# Patient Record
Sex: MALE | Race: WHITE | NOT HISPANIC OR LATINO | URBAN - METROPOLITAN AREA
[De-identification: names, ages, dates, MRNs, and addresses within clinical notes are randomized per-mention and may not be internally consistent; named-entity substitution may affect disease eponyms.]

---

## 2018-09-06 ENCOUNTER — OUTPATIENT (OUTPATIENT)
Dept: OUTPATIENT SERVICES | Facility: HOSPITAL | Age: 83
LOS: 1 days | End: 2018-09-06

## 2018-09-06 VITALS
HEART RATE: 90 BPM | RESPIRATION RATE: 16 BRPM | OXYGEN SATURATION: 98 % | DIASTOLIC BLOOD PRESSURE: 94 MMHG | WEIGHT: 218.04 LBS | TEMPERATURE: 98 F | SYSTOLIC BLOOD PRESSURE: 166 MMHG | HEIGHT: 70.5 IN

## 2018-09-06 DIAGNOSIS — K40.90 UNILATERAL INGUINAL HERNIA, WITHOUT OBSTRUCTION OR GANGRENE, NOT SPECIFIED AS RECURRENT: ICD-10-CM

## 2018-09-06 DIAGNOSIS — I10 ESSENTIAL (PRIMARY) HYPERTENSION: ICD-10-CM

## 2018-09-06 PROBLEM — Z00.00 ENCOUNTER FOR PREVENTIVE HEALTH EXAMINATION: Status: ACTIVE | Noted: 2018-09-06

## 2018-09-06 LAB
BUN SERPL-MCNC: 17 MG/DL — SIGNIFICANT CHANGE UP (ref 7–23)
CALCIUM SERPL-MCNC: 9.4 MG/DL — SIGNIFICANT CHANGE UP (ref 8.4–10.5)
CHLORIDE SERPL-SCNC: 106 MMOL/L — SIGNIFICANT CHANGE UP (ref 98–107)
CO2 SERPL-SCNC: 23 MMOL/L — SIGNIFICANT CHANGE UP (ref 22–31)
CREAT SERPL-MCNC: 1.34 MG/DL — HIGH (ref 0.5–1.3)
GLUCOSE SERPL-MCNC: 95 MG/DL — SIGNIFICANT CHANGE UP (ref 70–99)
HCT VFR BLD CALC: 40.1 % — SIGNIFICANT CHANGE UP (ref 39–50)
HGB BLD-MCNC: 13.4 G/DL — SIGNIFICANT CHANGE UP (ref 13–17)
MCHC RBC-ENTMCNC: 28.5 PG — SIGNIFICANT CHANGE UP (ref 27–34)
MCHC RBC-ENTMCNC: 33.4 % — SIGNIFICANT CHANGE UP (ref 32–36)
MCV RBC AUTO: 85.3 FL — SIGNIFICANT CHANGE UP (ref 80–100)
NRBC # FLD: 0 — SIGNIFICANT CHANGE UP
PLATELET # BLD AUTO: 165 K/UL — SIGNIFICANT CHANGE UP (ref 150–400)
PMV BLD: 9.8 FL — SIGNIFICANT CHANGE UP (ref 7–13)
POTASSIUM SERPL-MCNC: 4.1 MMOL/L — SIGNIFICANT CHANGE UP (ref 3.5–5.3)
POTASSIUM SERPL-SCNC: 4.1 MMOL/L — SIGNIFICANT CHANGE UP (ref 3.5–5.3)
RBC # BLD: 4.7 M/UL — SIGNIFICANT CHANGE UP (ref 4.2–5.8)
RBC # FLD: 13.6 % — SIGNIFICANT CHANGE UP (ref 10.3–14.5)
SODIUM SERPL-SCNC: 141 MMOL/L — SIGNIFICANT CHANGE UP (ref 135–145)
WBC # BLD: 4.79 K/UL — SIGNIFICANT CHANGE UP (ref 3.8–10.5)
WBC # FLD AUTO: 4.79 K/UL — SIGNIFICANT CHANGE UP (ref 3.8–10.5)

## 2018-09-06 NOTE — H&P PST ADULT - PROBLEM SELECTOR PLAN 2
pt takes tamsulosin and lisinopril at night , instructed to take the night before surgery , monitor BP during hospital stay . BP elevated in pst , cardiac evaluation pending from Dr GABE Walton , [id number] ekg and stress test , requested to be faxed . Pt offers no complaints in pst , denies headache , visual changes or numbness or tingling.

## 2018-09-06 NOTE — H&P PST ADULT - HISTORY OF PRESENT ILLNESS
This is an 84 y.o. male who presented to [facility] complaining of inguinal hernia . Pt evaluated , now for surgery .

## 2018-09-06 NOTE — H&P PST ADULT - MUSCULOSKELETAL
details… detailed exam normal strength/ROM intact/no joint warmth/no joint swelling/no joint erythema/no calf tenderness

## 2018-09-06 NOTE — H&P PST ADULT - LYMPHATIC
anterior cervical R/posterior cervical R/anterior cervical L/supraclavicular L/posterior cervical L/supraclavicular R

## 2018-09-06 NOTE — H&P PST ADULT - RS GEN PE MLT RESP DETAILS PC
clear to auscultation bilaterally/respirations non-labored/no rhonchi/no wheezes/breath sounds equal/good air movement/no rales

## 2018-09-06 NOTE — H&P PST ADULT - NSANTHOSAYNRD_GEN_A_CORE
No. RONY screening performed.  STOP BANG Legend: 0-2 = LOW Risk; 3-4 = INTERMEDIATE Risk; 5-8 = HIGH Risk

## 2018-09-12 ENCOUNTER — TRANSCRIPTION ENCOUNTER (OUTPATIENT)
Age: 83
End: 2018-09-12

## 2018-09-12 NOTE — ASU PATIENT PROFILE, ADULT - TEACHING/LEARNING LEARNING PREFERENCES
computer/internet/written material/group instruction/individual instruction/video/verbal instruction/skill demonstration/audio/pictorial

## 2018-09-13 ENCOUNTER — OUTPATIENT (OUTPATIENT)
Dept: OUTPATIENT SERVICES | Facility: HOSPITAL | Age: 83
LOS: 1 days | Discharge: HOME | End: 2018-09-13
Payer: MEDICARE

## 2018-09-13 ENCOUNTER — RESULT REVIEW (OUTPATIENT)
Age: 83
End: 2018-09-13

## 2018-09-13 VITALS
RESPIRATION RATE: 14 BRPM | OXYGEN SATURATION: 100 % | HEART RATE: 79 BPM | DIASTOLIC BLOOD PRESSURE: 85 MMHG | TEMPERATURE: 97 F | SYSTOLIC BLOOD PRESSURE: 156 MMHG

## 2018-09-13 VITALS
SYSTOLIC BLOOD PRESSURE: 176 MMHG | HEIGHT: 70.5 IN | RESPIRATION RATE: 18 BRPM | DIASTOLIC BLOOD PRESSURE: 95 MMHG | HEART RATE: 81 BPM | TEMPERATURE: 98 F | OXYGEN SATURATION: 99 % | WEIGHT: 218.04 LBS

## 2018-09-13 DIAGNOSIS — K40.90 UNILATERAL INGUINAL HERNIA, WITHOUT OBSTRUCTION OR GANGRENE, NOT SPECIFIED AS RECURRENT: ICD-10-CM

## 2018-09-13 PROCEDURE — 88304 TISSUE EXAM BY PATHOLOGIST: CPT | Mod: 26

## 2018-09-13 PROCEDURE — 88302 TISSUE EXAM BY PATHOLOGIST: CPT | Mod: 26

## 2018-09-13 RX ORDER — SODIUM CHLORIDE 9 MG/ML
1000 INJECTION, SOLUTION INTRAVENOUS
Qty: 0 | Refills: 0 | Status: DISCONTINUED | OUTPATIENT
Start: 2018-09-13 | End: 2018-09-14

## 2018-09-13 RX ADMIN — SODIUM CHLORIDE 30 MILLILITER(S): 9 INJECTION, SOLUTION INTRAVENOUS at 13:57

## 2018-09-13 NOTE — ASU DISCHARGE PLAN (ADULT/PEDIATRIC). - NURSING INSTRUCTIONS
1 Percocet contains 325mg. of Tylenol (ACETAMINOPHEN) . DO NOT EXCEED 3000mg  of Tylenol in a 24 hour period. 1 Percocet contains 325mg. of Tylenol (ACETAMINOPHEN) . DO NOT EXCEED 3000mg  of Tylenol in a 24 hour period.  When taking pain meds - take with food and know it may cause constipation. To prevent constipation increase fluids and fiber in diet. - Do NOT drive while on narcotics.   You were given IV Tylenol for pain management.  Please DO NOT take tylenol for the next 6-8 hours (after 5pm ). Please do not exceed 3000mg in 24hours.   You were given an antibiotic ( Cefazolin 2000mg ) in OR today about 10:30am

## 2018-09-13 NOTE — ASU DISCHARGE PLAN (ADULT/PEDIATRIC). - BATHING
shower in 24 hours, leave steri strips intact shower only/shower in 24 hours, leave steri strips intact

## 2018-09-13 NOTE — ASU DISCHARGE PLAN (ADULT/PEDIATRIC). - B. DRINK ALCOHOL, BEER, OR WINE
34y/o  POD#1 from [facility] for breech in stable condition. Current issues include low urine output.
A/P: 34yo POD#3 s/p LTCS.  Patient is stable and is doing well post-operatively.
A/P: 34yo POD#3 s/p LTCS. Patient is stable and is doing well post-operatively.
A/P: 36yo POD#2 s/p LTCS. Patient is stable and doing well post-operatively.
Statement Selected

## 2018-09-13 NOTE — ASU DISCHARGE PLAN (ADULT/PEDIATRIC). - CONDITIONS AT DISCHARGE
stable for discharge stable for discharge  Patient is stable and meets discharge criteria. Patient made aware that he/she must wait on unit to be escorted to awaiting car in front of the main building after being discharged by Anesthesia Department.

## 2018-09-13 NOTE — ASU DISCHARGE PLAN (ADULT/PEDIATRIC). - COMMENTS
Surgical Unit will call you on the next business day to follow up. [facility] is open Monday - Friday.

## 2018-09-13 NOTE — ASU DISCHARGE PLAN (ADULT/PEDIATRIC). - ITEMS TO FOLLOWUP WITH YOUR PHYSICIAN'S
follow up with Dr. Gagnon in 1 week, please call the office to set up an appointment follow up with Dr. Gagnon in 1 week, please call the office to set up an appointment  After showering pat dry steri strips.  Do Not rub them.  They will curl up and fall off by themselves within 7 days.

## 2018-09-13 NOTE — ASU DISCHARGE PLAN (ADULT/PEDIATRIC). - SPECIAL INSTRUCTIONS
RAY teaching, empty the drain daily. RAY teaching, empty the drain daily.  Marcus Medina drain instructions done verbally with skill demonstration done. Written instructions given to patient.

## 2018-09-13 NOTE — ASU DISCHARGE PLAN (ADULT/PEDIATRIC). - MEDICATION SUMMARY - MEDICATIONS TO TAKE
I will START or STAY ON the medications listed below when I get home from the hospital:    oxyCODONE-acetaminophen 5 mg-325 mg oral tablet  -- 1 tab(s) by mouth every 6 hours MDD:4  -- Caution federal law prohibits the transfer of this drug to any person other  than the person for whom it was prescribed.  May cause drowsiness.  Alcohol may intensify this effect.  Use care when operating dangerous machinery.  This prescription cannot be refilled.  This product contains acetaminophen.  Do not use  with any other product containing acetaminophen to prevent possible liver damage.  Using more of this medication than prescribed may cause serious breathing problems.    -- Indication: For severe pain, do not take with Tylenol    aspirin 81 mg oral tablet  -- 1 tab(s) by mouth once a day  last dose 9/5  -- Indication: For CAD    lisinopril 10 mg oral tablet  -- 1 tab(s) by mouth once a day in pm  -- Indication: For HTN    tamsulosin 0.4 mg oral capsule  -- 1 cap(s) by mouth once a day in pm  -- Indication: For BPH

## 2018-09-13 NOTE — BRIEF OPERATIVE NOTE - PROCEDURE
<<-----Click on this checkbox to enter Procedure Inguinal hernia repair with mesh  09/13/2018  right  Active  YALSALMAY

## 2018-09-13 NOTE — ASU DISCHARGE PLAN (ADULT/PEDIATRIC). - NOTIFY
Persistent Nausea and Vomiting/Bleeding that does not stop/Pain not relieved by Medications/Swelling that continues/Unable to Urinate Persistent Nausea and Vomiting/Bleeding that does not stop/Pain not relieved by Medications/Swelling that continues/Fever greater than 101/Unable to Urinate/Inability to Tolerate Liquids or Foods

## 2018-09-18 LAB — SURGICAL PATHOLOGY STUDY: SIGNIFICANT CHANGE UP

## 2021-06-13 ENCOUNTER — APPOINTMENT (OUTPATIENT)
Dept: CT IMAGING | Age: 86
End: 2021-06-13
Attending: EMERGENCY MEDICINE
Payer: MEDICARE

## 2021-06-13 ENCOUNTER — HOSPITAL ENCOUNTER (EMERGENCY)
Age: 86
Discharge: HOME | End: 2021-06-13
Attending: EMERGENCY MEDICINE
Payer: MEDICARE

## 2021-06-13 VITALS
SYSTOLIC BLOOD PRESSURE: 159 MMHG | DIASTOLIC BLOOD PRESSURE: 79 MMHG | RESPIRATION RATE: 20 BRPM | BODY MASS INDEX: 30.48 KG/M2 | HEART RATE: 88 BPM | OXYGEN SATURATION: 97 % | WEIGHT: 230 LBS | HEIGHT: 73 IN | TEMPERATURE: 99.5 F

## 2021-06-13 DIAGNOSIS — S01.81XA FACIAL LACERATION, INITIAL ENCOUNTER: ICD-10-CM

## 2021-06-13 DIAGNOSIS — N39.0 URINARY TRACT INFECTION WITHOUT HEMATURIA, SITE UNSPECIFIED: ICD-10-CM

## 2021-06-13 DIAGNOSIS — N28.9 RENAL INSUFFICIENCY: ICD-10-CM
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
## 2021-06-13 DIAGNOSIS — I10 ESSENTIAL HYPERTENSION: ICD-10-CM

## 2021-06-13 LAB
ANION GAP SERPL CALC-SCNC: 9 MMOL/L (ref 3–18)
APPEARANCE UR: ABNORMAL
BACTERIA URNS QL MICRO: ABNORMAL /HPF
BASOPHILS # BLD: 0.1 K/UL (ref 0–0.1)
BASOPHILS NFR BLD: 1 % (ref 0–2)
BILIRUB UR QL: NEGATIVE
BUN SERPL-MCNC: 36 MG/DL (ref 7–18)
BUN/CREAT SERPL: 22 (ref 12–20)
CALCIUM SERPL-MCNC: 8.6 MG/DL (ref 8.5–10.1)
CHLORIDE SERPL-SCNC: 112 MMOL/L (ref 100–111)
CK MB CFR SERPL CALC: 1.5 % (ref 0–4)
CK MB SERPL-MCNC: 3.3 NG/ML (ref 5–25)
CK SERPL-CCNC: 226 U/L (ref 39–308)
CO2 SERPL-SCNC: 24 MMOL/L (ref 21–32)
COLOR UR: ABNORMAL
CREAT SERPL-MCNC: 1.65 MG/DL (ref 0.6–1.3)
DIFFERENTIAL METHOD BLD: ABNORMAL
EOSINOPHIL # BLD: 0.1 K/UL (ref 0–0.4)
EOSINOPHIL NFR BLD: 1 % (ref 0–5)
ERYTHROCYTE [DISTWIDTH] IN BLOOD BY AUTOMATED COUNT: 13.1 % (ref 11.6–14.5)
GLUCOSE BLD STRIP.AUTO-MCNC: 96 MG/DL (ref 70–110)
GLUCOSE SERPL-MCNC: 96 MG/DL (ref 74–99)
GLUCOSE UR STRIP.AUTO-MCNC: NEGATIVE MG/DL
HCT VFR BLD AUTO: 39.2 % (ref 36–48)
HGB BLD-MCNC: 12.9 G/DL (ref 13–16)
HGB UR QL STRIP: ABNORMAL
KETONES UR QL STRIP.AUTO: ABNORMAL MG/DL
LEUKOCYTE ESTERASE UR QL STRIP.AUTO: ABNORMAL
LYMPHOCYTES # BLD: 1.1 K/UL (ref 0.9–3.6)
LYMPHOCYTES NFR BLD: 14 % (ref 21–52)
MCH RBC QN AUTO: 27.6 PG (ref 24–34)
MCHC RBC AUTO-ENTMCNC: 32.9 G/DL (ref 31–37)
MCV RBC AUTO: 83.8 FL (ref 74–97)
MONOCYTES # BLD: 0.6 K/UL (ref 0.05–1.2)
MONOCYTES NFR BLD: 7 % (ref 3–10)
NEUTS SEG # BLD: 6.5 K/UL (ref 1.8–8)
NEUTS SEG NFR BLD: 77 % (ref 40–73)
NITRITE UR QL STRIP.AUTO: POSITIVE
PH UR STRIP: 5.5 [PH] (ref 5–8)
PLATELET # BLD AUTO: 281 K/UL (ref 135–420)
PMV BLD AUTO: 10 FL (ref 9.2–11.8)
POTASSIUM SERPL-SCNC: 4.2 MMOL/L (ref 3.5–5.5)
PROT UR STRIP-MCNC: 100 MG/DL
RBC # BLD AUTO: 4.68 M/UL (ref 4.35–5.65)
RBC #/AREA URNS HPF: ABNORMAL /HPF (ref 0–5)
SODIUM SERPL-SCNC: 145 MMOL/L (ref 136–145)
SP GR UR REFRACTOMETRY: 1.01 (ref 1–1.03)
TROPONIN I SERPL-MCNC: 0.07 NG/ML (ref 0–0.04)
TROPONIN I SERPL-MCNC: 0.08 NG/ML (ref 0–0.04)
UROBILINOGEN UR QL STRIP.AUTO: 1 EU/DL (ref 0.2–1)
WBC # BLD AUTO: 8.4 K/UL (ref 4.6–13.2)
WBC URNS QL MICRO: ABNORMAL /HPF (ref 0–4)

## 2021-06-13 PROCEDURE — 80048 BASIC METABOLIC PNL TOTAL CA: CPT

## 2021-06-13 PROCEDURE — 81001 URINALYSIS AUTO W/SCOPE: CPT

## 2021-06-13 PROCEDURE — 93005 ELECTROCARDIOGRAM TRACING: CPT

## 2021-06-13 PROCEDURE — 70450 CT HEAD/BRAIN W/O DYE: CPT

## 2021-06-13 PROCEDURE — 82962 GLUCOSE BLOOD TEST: CPT

## 2021-06-13 PROCEDURE — 99285 EMERGENCY DEPT VISIT HI MDM: CPT

## 2021-06-13 PROCEDURE — 85025 COMPLETE CBC W/AUTO DIFF WBC: CPT

## 2021-06-13 PROCEDURE — 82553 CREATINE MB FRACTION: CPT

## 2021-06-13 RX ORDER — SULFAMETHOXAZOLE AND TRIMETHOPRIM 800; 160 MG/1; MG/1
1 TABLET ORAL 2 TIMES DAILY
Qty: 10 TABLET | Refills: 0 | Status: SHIPPED | OUTPATIENT
Start: 2021-06-13 | End: 2021-06-18
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
## 2021-06-13 NOTE — ED PROVIDER NOTES
63-year-old male history of hypertension presents for evaluation of fall. Patient here with his wife staying in a local hotel while visiting relatives. Had not eaten since yesterday evening. He does not recall the fall. His wife states she heard a thump and look to find that he was on the floor. He did not immediately respond and took a few seconds to come around. Unclear syncope. No seizure activity. No chest pain. Patient has a cut lateral to the left periorbital region. Modest bleeding. Denies headache. Denies chest pain or shortness of breath. Has no focal complaints at this time and states that he feels \"pretty good. \"           Past Medical History:   Diagnosis Date    Hypertension        Past Surgical History:   Procedure Laterality Date    HX HERNIA REPAIR           History reviewed. No pertinent family history. Social History     Socioeconomic History    Marital status:      Spouse name: Not on file    Number of children: Not on file    Years of education: Not on file    Highest education level: Not on file   Occupational History    Not on file   Tobacco Use    Smoking status: Never Smoker    Smokeless tobacco: Never Used   Substance and Sexual Activity    Alcohol use: Never    Drug use: Not on file    Sexual activity: Not on file   Other Topics Concern    Not on file   Social History Narrative    Not on file     Social Determinants of Health     Financial Resource Strain:     Difficulty of Paying Living Expenses:    Food Insecurity:     Worried About Running Out of Food in the Last Year:     [address] in the Last Year:    Transportation Needs:     Lack of Transportation (Medical):      Lack of Transportation (Non-Medical):    Physical Activity:     Days of Exercise per Week:     Minutes of Exercise per Session:    Stress:     Feeling of Stress :    Social Connections:     Frequency of Communication with Friends and Family:     Frequency of Social Gatherings with Friends and Family:     Attends Yarsanism Services:     Active Member of Clubs or Organizations:     Attends Club or Organization Meetings:     Marital Status:    Intimate Partner Violence:     Fear of Current or Ex-Partner:     Emotionally Abused:     Physically Abused:     Sexually Abused: ALLERGIES: Patient has no known allergies. Review of Systems   Constitutional: Negative for fever. HENT: Negative for congestion. Respiratory: Negative for shortness of breath. Cardiovascular: Negative for chest pain. Gastrointestinal: Negative for diarrhea and vomiting. Neurological: Positive for syncope (possibly, see hpi). Negative for light-headedness and headaches. All other systems reviewed and are negative. Vitals:    06/13/21 1430 06/13/21 1456 06/13/21 1530 06/13/21 1600   BP: (!) 168/98  (!) 159/77 (!) 147/66   Pulse: 90 90 95 91   Resp: 17 19 20 19   Temp:       SpO2: 100% 98%     Weight:       Height:                Physical Exam  Vitals and nursing note reviewed. Constitutional:       General: He is not in acute distress. Appearance: He is well-developed. HENT:      Head: Normocephalic. Comments: 1.5 cm laceration superior and lateral to the left eye. Modest bleeding. No ocular involvement. Small hematoma at the laceration site. No bony deformity. Nose: No congestion or rhinorrhea. Mouth/Throat:      Pharynx: Oropharynx is clear. Eyes:      General: No scleral icterus. Extraocular Movements: Extraocular movements intact. Pupils: Pupils are equal, round, and reactive to light. Neck:      Comments: No cervical spine tenderness. Cardiovascular:      Rate and Rhythm: Normal rate and regular rhythm. Heart sounds: Normal heart sounds. Pulmonary:      Effort: Pulmonary effort is normal.      Breath sounds: Normal breath sounds. Chest:      Chest wall: No tenderness. Abdominal:      Tenderness:  There is no abdominal tenderness. There is no guarding. Musculoskeletal:         General: No swelling or tenderness. Skin:     General: Skin is warm and dry. Neurological:      General: No focal deficit present. Mental Status: He is alert and oriented to person, place, and time. Cranial Nerves: No cranial nerve deficit. Sensory: No sensory deficit. Psychiatric:         Mood and Affect: Mood normal.       CT HEAD WO CONT   Final Result      No CT evidence of acute intracranial hemorrhage. 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 Mild burden of presumed chronic white matter microvascular ischemic changes. Recent Results (from the past 12 hour(s))   EKG, 12 LEAD, INITIAL    Collection Time: 06/13/21 11:55 AM   Result Value Ref Range    Ventricular Rate 97 BPM    Atrial Rate 97 BPM    P-R Interval 170 ms    QRS Duration 84 ms    Q-T Interval 400 ms    QTC Calculation (Bezet) 508 ms    Calculated P Axis 77 degrees    Calculated R Axis 16 degrees    Calculated T Axis 96 degrees    Diagnosis       Normal sinus rhythm  Nonspecific T wave abnormality  Prolonged QT  Abnormal ECG  No previous ECGs available     CBC WITH AUTOMATED DIFF    Collection Time: 06/13/21 12:00 PM   Result Value Ref Range    WBC 8.4 4.6 - 13.2 K/uL    RBC 4.68 4.35 - 5.65 M/uL    HGB 12.9 (L) 13.0 - 16.0 g/dL    HCT 39.2 36.0 - 48.0 %    MCV 83.8 74.0 - 97.0 FL    MCH 27.6 24.0 - 34.0 PG    MCHC 32.9 31.0 - 37.0 g/dL    RDW 13.1 11.6 - 14.5 %    PLATELET 527 072 - 653 K/uL    MPV 10.0 9.2 - 11.8 FL    NEUTROPHILS 77 (H) 40 - 73 %    LYMPHOCYTES 14 (L) 21 - 52 %    MONOCYTES 7 3 - 10 %    EOSINOPHILS 1 0 - 5 %    BASOPHILS 1 0 - 2 %    ABS. NEUTROPHILS 6.5 1.8 - 8.0 K/UL    ABS. LYMPHOCYTES 1.1 0.9 - 3.6 K/UL    ABS. MONOCYTES 0.6 0.05 - 1.2 K/UL    ABS.  EOSINOPHILS 0.1 0.0 - 0.4 K/UL    ABS. BASOPHILS 0.1 0.0 - 0.1 K/UL    DF AUTOMATED     METABOLIC PANEL, BASIC    Collection Time: 06/13/21 12:00 PM   Result Value Ref Range    Sodium 145 136 - 145 mmol/L    Potassium 4.2 3.5 - 5.5 mmol/L    Chloride 112 (H) 100 - 111 mmol/L    CO2 24 21 - 32 mmol/L    Anion gap 9 3.0 - 18 mmol/L    Glucose 96 74 - 99 mg/dL    BUN 36 (H) 7.0 - 18 MG/DL    Creatinine 1.65 (H) 0.6 - 1.3 MG/DL    BUN/Creatinine ratio 22 (H) 12 - 20      GFR est AA 48 (L) >60 ml/min/1.73m2    GFR est non-AA 40 (L) >60 ml/min/1.73m2    Calcium 8.6 8.5 - 10.1 MG/DL   CARDIAC PANEL,(CK, CKMB & TROPONIN)    Collection Time: 06/13/21 12:00 PM   Result Value Ref Range    CK - MB 3.3 <3.6 ng/ml    CK-MB Index 1.5 0.0 - 4.0 %     39 - 308 U/L    Troponin-I, QT 0.08 (H) 0.0 - 0.045 NG/ML   GLUCOSE, POC    Collection Time: 06/13/21 12:07 PM   Result Value Ref Range    Glucose (POC) 96 70 - 110 mg/dL   URINALYSIS W/ RFLX MICROSCOPIC    Collection Time: 06/13/21  3:15 PM   Result Value Ref Range    Color DARK YELLOW      Appearance CLOUDY      Specific gravity 1.013 1.005 - 1.030      pH (UA) 5.5 5.0 - 8.0      Protein 100 (A) NEG mg/dL    Glucose Negative NEG mg/dL    Ketone TRACE (A) NEG mg/dL    Bilirubin Negative NEG      Blood SMALL (A) NEG      Urobilinogen 1.0 0.2 - 1.0 EU/dL    Nitrites Positive (A) NEG      Leukocyte Esterase LARGE (A) NEG     URINE MICROSCOPIC ONLY    Collection Time: 06/13/21  3:15 PM   Result Value Ref Range    WBC TOO NUMEROUS TO COUNT 0 - 4 /hpf    RBC 0 to 3 0 - 5 /hpf    Bacteria 1+ (A) NEG /hpf   TROPONIN I    Collection Time: 06/13/21  4:15 PM   Result Value Ref Range    Troponin-I, QT 0.07 (H) 0.0 - 0.045 NG/ML     EKG interpreted per myself at 1157 hrs. reveals sinus mechanism, rate 97. Prolonged QT interval.  Nonspecific lateral ST-T wave change. (No old EKGs for comparison. )        MDM  Number of Diagnoses or Management Options  Essential hypertension  Facial laceration, initial encounter  Fall, initial encounter  Renal insufficiency  Urinary tract infection without hematuria, site unspecified  Diagnosis management comments: Impression: Status post fall with facial laceration. Unclear whether the patient simply lost his balance or perhaps suffered syncope. Screening labs and EKG reviewed. Head CT negative for acute traumatic pathology. Urinalysis positive. Will initiate treatment. Initial CPK elevated, repeat level slightly lower. No chest pain. Sinus rhythm. Stable for follow-up. Procedures      Diagnosis:   1. Fall, initial encounter    2. Facial laceration, initial encounter    3. Renal insufficiency    4. Essential hypertension    5. Urinary tract infection without hematuria, site unspecified          Disposition: home    Follow-up Information     Follow up With Specialties Details Why Contact Info    Your PCP   upon return to [address] [place] [address] DEPT Emergency Medicine  As needed, If symptoms worsen [address] [id number] [phone]          Patient's Medications   Start Taking    TRIMETHOPRIM-SULFAMETHOXAZOLE (BACTRIM DS) 160-800 MG PER TABLET    Take 1 Tablet by mouth two (2) times a day for 5 days.    Continue Taking    No medications on file   These Medications have changed    No medications on file   Stop Taking    No medications on file

## 2021-06-13 NOTE — DISCHARGE INSTRUCTIONS
Suture removal in 7 days. Okay to take Tylenol for headache. Head CT negative for acute traumatic injury. Return for severe pain, repetitive vomiting, acute change in vision or mental status, or other acutely worsening symptoms. See your primary care physician upon return to [place] for recheck. Blood pressure elevated to 168/98 (normal is less than 130/80). Follow-up with primary care physician for recheck of same. Urine positive for infection start Bactrim and take as directed.

## 2021-06-14 LAB
ATRIAL RATE: 97 BPM
CALCULATED P AXIS, ECG09: 77 DEGREES
CALCULATED R AXIS, ECG10: 16 DEGREES
CALCULATED T AXIS, ECG11: 96 DEGREES
DIAGNOSIS, 93000: NORMAL
P-R INTERVAL, ECG05: 170 MS
Q-T INTERVAL, ECG07: 400 MS
QRS DURATION, ECG06: 84 MS
QTC CALCULATION (BEZET), ECG08: 508 MS
VENTRICULAR RATE, ECG03: 97 BPM

## 2021-08-27 ENCOUNTER — INPATIENT (INPATIENT)
Facility: HOSPITAL | Age: 86
LOS: 6 days | Discharge: ANOTHER HOSPITAL | End: 2021-09-03
Attending: STUDENT IN AN ORGANIZED HEALTH CARE EDUCATION/TRAINING PROGRAM | Admitting: STUDENT IN AN ORGANIZED HEALTH CARE EDUCATION/TRAINING PROGRAM
Payer: MEDICARE

## 2021-08-27 VITALS
RESPIRATION RATE: 17 BRPM | TEMPERATURE: 98 F | HEIGHT: 70.5 IN | OXYGEN SATURATION: 100 % | HEART RATE: 101 BPM | SYSTOLIC BLOOD PRESSURE: 141 MMHG | DIASTOLIC BLOOD PRESSURE: 63 MMHG

## 2021-08-27 LAB
ALBUMIN SERPL ELPH-MCNC: 3.7 G/DL — SIGNIFICANT CHANGE UP (ref 3.3–5)
ALP SERPL-CCNC: 61 U/L — SIGNIFICANT CHANGE UP (ref 40–120)
ALT FLD-CCNC: 12 U/L — SIGNIFICANT CHANGE UP (ref 4–41)
ANION GAP SERPL CALC-SCNC: 12 MMOL/L — SIGNIFICANT CHANGE UP (ref 7–14)
APPEARANCE UR: ABNORMAL
AST SERPL-CCNC: 16 U/L — SIGNIFICANT CHANGE UP (ref 4–40)
BASOPHILS # BLD AUTO: 0.02 K/UL — SIGNIFICANT CHANGE UP (ref 0–0.2)
BASOPHILS NFR BLD AUTO: 0.5 % — SIGNIFICANT CHANGE UP (ref 0–2)
BILIRUB SERPL-MCNC: 0.7 MG/DL — SIGNIFICANT CHANGE UP (ref 0.2–1.2)
BILIRUB UR-MCNC: NEGATIVE — SIGNIFICANT CHANGE UP
BLD GP AB SCN SERPL QL: NEGATIVE — SIGNIFICANT CHANGE UP
BUN SERPL-MCNC: 18 MG/DL — SIGNIFICANT CHANGE UP (ref 7–23)
CALCIUM SERPL-MCNC: 9.4 MG/DL — SIGNIFICANT CHANGE UP (ref 8.4–10.5)
CHLORIDE SERPL-SCNC: 106 MMOL/L — SIGNIFICANT CHANGE UP (ref 98–107)
CO2 SERPL-SCNC: 23 MMOL/L — SIGNIFICANT CHANGE UP (ref 22–31)
COLOR SPEC: YELLOW — SIGNIFICANT CHANGE UP
CREAT SERPL-MCNC: 1.32 MG/DL — HIGH (ref 0.5–1.3)
DIFF PNL FLD: ABNORMAL
EOSINOPHIL # BLD AUTO: 0.04 K/UL — SIGNIFICANT CHANGE UP (ref 0–0.5)
EOSINOPHIL NFR BLD AUTO: 0.9 % — SIGNIFICANT CHANGE UP (ref 0–6)
GLUCOSE SERPL-MCNC: 89 MG/DL — SIGNIFICANT CHANGE UP (ref 70–99)
GLUCOSE UR QL: NEGATIVE — SIGNIFICANT CHANGE UP
HCT VFR BLD CALC: 38.3 % — LOW (ref 39–50)
HGB BLD-MCNC: 12.4 G/DL — LOW (ref 13–17)
IANC: 2.94 K/UL — SIGNIFICANT CHANGE UP (ref 1.5–8.5)
IMM GRANULOCYTES NFR BLD AUTO: 0.2 % — SIGNIFICANT CHANGE UP (ref 0–1.5)
KETONES UR-MCNC: ABNORMAL
LEUKOCYTE ESTERASE UR-ACNC: ABNORMAL
LYMPHOCYTES # BLD AUTO: 0.82 K/UL — LOW (ref 1–3.3)
LYMPHOCYTES # BLD AUTO: 18.9 % — SIGNIFICANT CHANGE UP (ref 13–44)
MCHC RBC-ENTMCNC: 28.1 PG — SIGNIFICANT CHANGE UP (ref 27–34)
MCHC RBC-ENTMCNC: 32.4 GM/DL — SIGNIFICANT CHANGE UP (ref 32–36)
MCV RBC AUTO: 86.8 FL — SIGNIFICANT CHANGE UP (ref 80–100)
MONOCYTES # BLD AUTO: 0.52 K/UL — SIGNIFICANT CHANGE UP (ref 0–0.9)
MONOCYTES NFR BLD AUTO: 12 % — SIGNIFICANT CHANGE UP (ref 2–14)
NEUTROPHILS # BLD AUTO: 2.94 K/UL — SIGNIFICANT CHANGE UP (ref 1.8–7.4)
NEUTROPHILS NFR BLD AUTO: 67.5 % — SIGNIFICANT CHANGE UP (ref 43–77)
NITRITE UR-MCNC: POSITIVE
NRBC # BLD: 0 /100 WBCS — SIGNIFICANT CHANGE UP
NRBC # FLD: 0 K/UL — SIGNIFICANT CHANGE UP
PH UR: 6 — SIGNIFICANT CHANGE UP (ref 5–8)
PLATELET # BLD AUTO: 170 K/UL — SIGNIFICANT CHANGE UP (ref 150–400)
POTASSIUM SERPL-MCNC: 4.4 MMOL/L — SIGNIFICANT CHANGE UP (ref 3.5–5.3)
POTASSIUM SERPL-SCNC: 4.4 MMOL/L — SIGNIFICANT CHANGE UP (ref 3.5–5.3)
PROT SERPL-MCNC: 7 G/DL — SIGNIFICANT CHANGE UP (ref 6–8.3)
PROT UR-MCNC: ABNORMAL
RBC # BLD: 4.41 M/UL — SIGNIFICANT CHANGE UP (ref 4.2–5.8)
RBC # FLD: 14 % — SIGNIFICANT CHANGE UP (ref 10.3–14.5)
RH IG SCN BLD-IMP: POSITIVE — SIGNIFICANT CHANGE UP
SODIUM SERPL-SCNC: 141 MMOL/L — SIGNIFICANT CHANGE UP (ref 135–145)
SP GR SPEC: 1.01 — SIGNIFICANT CHANGE UP (ref 1–1.05)
UROBILINOGEN FLD QL: SIGNIFICANT CHANGE UP
WBC # BLD: 4.35 K/UL — SIGNIFICANT CHANGE UP (ref 3.8–10.5)
WBC # FLD AUTO: 4.35 K/UL — SIGNIFICANT CHANGE UP (ref 3.8–10.5)

## 2021-08-27 PROCEDURE — 70450 CT HEAD/BRAIN W/O DYE: CPT | Mod: 26

## 2021-08-27 PROCEDURE — 93010 ELECTROCARDIOGRAM REPORT: CPT

## 2021-08-27 PROCEDURE — 99285 EMERGENCY DEPT VISIT HI MDM: CPT | Mod: 25

## 2021-08-27 RX ORDER — AMLODIPINE BESYLATE 2.5 MG/1
5 TABLET ORAL ONCE
Refills: 0 | Status: COMPLETED | OUTPATIENT
Start: 2021-08-27 | End: 2021-08-28

## 2021-08-27 RX ORDER — CEFTRIAXONE 500 MG/1
1000 INJECTION, POWDER, FOR SOLUTION INTRAMUSCULAR; INTRAVENOUS ONCE
Refills: 0 | Status: COMPLETED | OUTPATIENT
Start: 2021-08-27 | End: 2021-08-27

## 2021-08-27 RX ADMIN — CEFTRIAXONE 100 MILLIGRAM(S): 500 INJECTION, POWDER, FOR SOLUTION INTRAMUSCULAR; INTRAVENOUS at 23:54

## 2021-08-27 NOTE — ED PROVIDER NOTE - ATTENDING CONTRIBUTION TO CARE
Attending Statement: I have personally seen and examined this patient. I have fully participated in the care of this patient. I have reviewed all pertinent clinical information, including history physical exam, plan and the Resident's note and agree except as noted  88yo M hx of HTN, HLD, pw for a subdural on outpt ct today.  subdural on outpatient CT with subfalcine herniation and 0.7 cm rightward midline shift, pt endorse dizziness and intermittent headache no n/v no numbness or weakness. Last aspirin was two days ago.  Vital signs noted. laying in bed, no distress. well appearing, no sing of head trauma. no work of breathing. EOMI. supple neck, nl  bl hands. nl sensation. nl strength of bl lower ext  plan labs, ct head, neurosurgery cs

## 2021-08-27 NOTE — ED PROVIDER NOTE - NS ED ROS FT
CONSTITUTIONAL: No fevers, no chills, no lightheadedness, no dizziness  Eyes: no visual changes  Ears: no ear drainage, no ear pain  Nose: no nasal congestion  Mouth/Throat: no sore throat  CV: No chest pain, no palpitations  PULM: No SOB, no cough  GI: +diarrhea, no n/v, no abd pain  : no dysuria, no hematuria  SKIN: no rashes.  NEURO: +headache, no focal weakness or numbness  LYMPH/VASC: no LE swelling

## 2021-08-27 NOTE — ED PROVIDER NOTE - PHYSICAL EXAMINATION
gen: well appearing  Mentation: AAO x3  psych: mood appropriate  ENT: airway patent  Eyes: conjunctivae clear bilaterally  Cardio: RRR, no m/r/g  Resp: normal BS b/l  GI: s/nt/nd  Neuro: sensation and motor function intact, CN 2-12 intact,   Skin: No evidence of rash  MSK: normal movement of all extremities  Lymph/Vasc: no LE edema

## 2021-08-27 NOTE — ED PROVIDER NOTE - PROGRESS NOTE DETAILS
Bailey, DO PGY-3: spoke to neurosurgery, discussed results from outpatient CT report, requesting CT scan as they do not currently have access to images, but will attempt to attain. Will come to evaluate patient. Patient reportedly confused to unable to obtain collateral, attempted to call wife but no answer on phone Neurosurgery reviewed images from [place], unchanged ct. no acute intervention. pt has no complaints. will admit to med, ct head repeat in am. treat for uti.
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
## 2021-08-27 NOTE — ED PROVIDER NOTE - CLINICAL SUMMARY MEDICAL DECISION MAKING FREE TEXT BOX
DO Bailey PGY-3: 86 y/o M presenting with confusion, HA found to have subdural on outpatient CT. Will c/s neurosurg, will need repeat imaging. Likely TBA

## 2021-08-27 NOTE — ED PROVIDER NOTE - INTERNATIONAL TRAVEL
Patient Education     Borrelia Antibody (Blood)  Does this test have other names?  Borrelia burgdorferi antibodies test, IgM/IgG test, Lyme disease test  What is this test?  This test measures the level of Borrelia antibodies in your blood. Borrelia burgdorferi bacteria cause Lyme disease.  The bacteria are spread to humans through the bite of an infected tick.  If not treated, Lyme disease can cause meningitis, or an infection of the tissues covering the brain and spinal cord. It can also cause liver and heart problems; facial palsy, or the inability to control facial muscles; and other complications that may show up months or even years later. These include ongoing pain and tiredness, arthritis, and problems with memory and concentration. Lyme disease is the most common tick-borne condition in the [place]  The CDC recommends a two-step evaluation of your blood test. First, your blood sample is tested through a process called enzyme immunoassay (EIA) or indirect immunofluorescence assay (IFA). If this is positive for Borrelia antibodies, the sample is put through an immunoblot test, also known as a Western blot test. This test measures immunoglobulin G (IgG) and immunoglobulin M (IgM) antibodies in your blood.  You will likely receive a positive diagnosis for Lyme disease if both the EIA/IFA and the Western blot test are positive. But in some cases, your healthcare provider may order other tests, such as testing your cerebrospinal fluid.  Why do I need this test?  You might have this test if your healthcare provider suspects that you have Lyme disease. Symptoms include a red bump that looks like a spider bite that spreads into a red rash in a classic bull's-eye pattern. You may also have:  · Fever  · Chills  · Headache  · Swollen lymph nodes  · Muscle and joint aches  · Stiff neck  · Shooting pains  What other tests might I have along with this test?  Your healthcare provider may also order a test to look for  Borrelia antibodies in your cerebrospinal fluid if you have signs that your central nervous system has been affected. This may also be done if the blood test results aren't clear.  What do my test results mean?  Many things may affect your lab test results. These include the method each lab uses to do the test. Even if your test results are different from the normal value, you may not have a problem. To learn what the results mean for you, talk with your healthcare provider.  Normal results are negative, meaning that no antibodies were found. But Lyme disease is hard to diagnose, partly because the antibodies may not show up in your blood for several weeks. If your results are negative shortly after you've been infected, the result could be a false-negative.  A positive result means that Borrelia antibodies were found and that you may have Lyme disease. False-positive results sometimes do occur, so the test could say you have the infection when you don't.  False-positive results can also happen if you have the autoimmune disease lupus, HIV, or syphilis. They can also happen if you have Helicobacter pylori bacteria or the Kvng-Barr virus.  How is this test done?  The test requires a blood sample, which is drawn through a needle from a vein in your arm.  Does this test pose any risks?  Taking a blood sample with a needle carries risks that include bleeding, infection, bruising, or feeling dizzy. When the needle pricks your arm, you may feel a slight stinging sensation or pain. Afterward, the site may be slightly sore.  What might affect my test results?  The Lyme disease vaccine might affect your test results. If you are tested too soon after having been infected, you may get a false-negative result. If you have been treated with antibiotics, your results may be affected, too.  How do I get ready for this test?  You don't need to prepare for this test. But be sure your healthcare provider knows about all medicines,  herbs, vitamins, and supplements you are taking. This includes medicines that don't need a prescription and any illicit drugs you may use.     © 2213-5673 [employer]. [address] [place] [address], [place] [address]. All rights reserved. This information is not intended as a substitute for professional medical care. Always follow your healthcare professional's instructions.            No

## 2021-08-27 NOTE — ED ADULT TRIAGE NOTE - CHIEF COMPLAINT QUOTE
Pt brought in by EMS from outpatient radiology for subdural hematoma. Pt has been confused and incontinent of urine over the past week prompting ct scan order. Pt a&ox4 no neuro deficits observed or reported  in triage. ASA daily

## 2021-08-28 DIAGNOSIS — R77.8 OTHER SPECIFIED ABNORMALITIES OF PLASMA PROTEINS: ICD-10-CM

## 2021-08-28 DIAGNOSIS — Z02.9 ENCOUNTER FOR ADMINISTRATIVE EXAMINATIONS, UNSPECIFIED: ICD-10-CM

## 2021-08-28 DIAGNOSIS — Z29.9 ENCOUNTER FOR PROPHYLACTIC MEASURES, UNSPECIFIED: ICD-10-CM

## 2021-08-28 DIAGNOSIS — R55 SYNCOPE AND COLLAPSE: ICD-10-CM

## 2021-08-28 DIAGNOSIS — N18.9 CHRONIC KIDNEY DISEASE, UNSPECIFIED: ICD-10-CM

## 2021-08-28 DIAGNOSIS — S06.5X9A TRAUMATIC SUBDURAL HEMORRHAGE WITH LOSS OF CONSCIOUSNESS OF UNSPECIFIED DURATION, INITIAL ENCOUNTER: ICD-10-CM

## 2021-08-28 DIAGNOSIS — N39.0 URINARY TRACT INFECTION, SITE NOT SPECIFIED: ICD-10-CM

## 2021-08-28 DIAGNOSIS — R53.2 FUNCTIONAL QUADRIPLEGIA: ICD-10-CM

## 2021-08-28 DIAGNOSIS — R94.31 ABNORMAL ELECTROCARDIOGRAM [ECG] [EKG]: ICD-10-CM

## 2021-08-28 DIAGNOSIS — I10 ESSENTIAL (PRIMARY) HYPERTENSION: ICD-10-CM

## 2021-08-28 LAB
A1C WITH ESTIMATED AVERAGE GLUCOSE RESULT: 4.7 % — SIGNIFICANT CHANGE UP (ref 4–5.6)
ANION GAP SERPL CALC-SCNC: 15 MMOL/L — HIGH (ref 7–14)
APTT BLD: 34.6 SEC — SIGNIFICANT CHANGE UP (ref 27–36.3)
BASOPHILS # BLD AUTO: 0.04 K/UL — SIGNIFICANT CHANGE UP (ref 0–0.2)
BASOPHILS NFR BLD AUTO: 0.9 % — SIGNIFICANT CHANGE UP (ref 0–2)
BUN SERPL-MCNC: 16 MG/DL — SIGNIFICANT CHANGE UP (ref 7–23)
CALCIUM SERPL-MCNC: 9.2 MG/DL — SIGNIFICANT CHANGE UP (ref 8.4–10.5)
CHLORIDE SERPL-SCNC: 104 MMOL/L — SIGNIFICANT CHANGE UP (ref 98–107)
CHOLEST SERPL-MCNC: 129 MG/DL — SIGNIFICANT CHANGE UP
CK MB BLD-MCNC: 2.9 % — HIGH (ref 0–2.5)
CK MB BLD-MCNC: 2.9 % — HIGH (ref 0–2.5)
CK MB CFR SERPL CALC: 3 NG/ML — SIGNIFICANT CHANGE UP
CK MB CFR SERPL CALC: 3.3 NG/ML — SIGNIFICANT CHANGE UP
CK SERPL-CCNC: 102 U/L — SIGNIFICANT CHANGE UP (ref 30–200)
CK SERPL-CCNC: 114 U/L — SIGNIFICANT CHANGE UP (ref 30–200)
CK SERPL-CCNC: 80 U/L — SIGNIFICANT CHANGE UP (ref 30–200)
CO2 SERPL-SCNC: 23 MMOL/L — SIGNIFICANT CHANGE UP (ref 22–31)
CREAT SERPL-MCNC: 1.23 MG/DL — SIGNIFICANT CHANGE UP (ref 0.5–1.3)
EOSINOPHIL # BLD AUTO: 0.04 K/UL — SIGNIFICANT CHANGE UP (ref 0–0.5)
EOSINOPHIL NFR BLD AUTO: 0.9 % — SIGNIFICANT CHANGE UP (ref 0–6)
ESTIMATED AVERAGE GLUCOSE: 88 — SIGNIFICANT CHANGE UP
GLUCOSE SERPL-MCNC: 96 MG/DL — SIGNIFICANT CHANGE UP (ref 70–99)
HCT VFR BLD CALC: 38.1 % — LOW (ref 39–50)
HDLC SERPL-MCNC: 40 MG/DL — LOW
HGB BLD-MCNC: 12.7 G/DL — LOW (ref 13–17)
IANC: 3.45 K/UL — SIGNIFICANT CHANGE UP (ref 1.5–8.5)
IMM GRANULOCYTES NFR BLD AUTO: 0.4 % — SIGNIFICANT CHANGE UP (ref 0–1.5)
INR BLD: 1.17 RATIO — HIGH (ref 0.88–1.16)
LIPID PNL WITH DIRECT LDL SERPL: 74 MG/DL — SIGNIFICANT CHANGE UP
LYMPHOCYTES # BLD AUTO: 0.49 K/UL — LOW (ref 1–3.3)
LYMPHOCYTES # BLD AUTO: 11 % — LOW (ref 13–44)
MAGNESIUM SERPL-MCNC: 2.1 MG/DL — SIGNIFICANT CHANGE UP (ref 1.6–2.6)
MCHC RBC-ENTMCNC: 28.3 PG — SIGNIFICANT CHANGE UP (ref 27–34)
MCHC RBC-ENTMCNC: 33.3 GM/DL — SIGNIFICANT CHANGE UP (ref 32–36)
MCV RBC AUTO: 84.9 FL — SIGNIFICANT CHANGE UP (ref 80–100)
MONOCYTES # BLD AUTO: 0.43 K/UL — SIGNIFICANT CHANGE UP (ref 0–0.9)
MONOCYTES NFR BLD AUTO: 9.6 % — SIGNIFICANT CHANGE UP (ref 2–14)
NEUTROPHILS # BLD AUTO: 3.45 K/UL — SIGNIFICANT CHANGE UP (ref 1.8–7.4)
NEUTROPHILS NFR BLD AUTO: 77.2 % — HIGH (ref 43–77)
NON HDL CHOLESTEROL: 89 MG/DL — SIGNIFICANT CHANGE UP
NRBC # BLD: 0 /100 WBCS — SIGNIFICANT CHANGE UP
NRBC # FLD: 0 K/UL — SIGNIFICANT CHANGE UP
PHOSPHATE SERPL-MCNC: 2.9 MG/DL — SIGNIFICANT CHANGE UP (ref 2.5–4.5)
PLATELET # BLD AUTO: 173 K/UL — SIGNIFICANT CHANGE UP (ref 150–400)
POTASSIUM SERPL-MCNC: 4.5 MMOL/L — SIGNIFICANT CHANGE UP (ref 3.5–5.3)
POTASSIUM SERPL-SCNC: 4.5 MMOL/L — SIGNIFICANT CHANGE UP (ref 3.5–5.3)
PROTHROM AB SERPL-ACNC: 13.2 SEC — SIGNIFICANT CHANGE UP (ref 10.6–13.6)
RBC # BLD: 4.49 M/UL — SIGNIFICANT CHANGE UP (ref 4.2–5.8)
RBC # FLD: 13.9 % — SIGNIFICANT CHANGE UP (ref 10.3–14.5)
SARS-COV-2 RNA SPEC QL NAA+PROBE: SIGNIFICANT CHANGE UP
SODIUM SERPL-SCNC: 142 MMOL/L — SIGNIFICANT CHANGE UP (ref 135–145)
TRIGL SERPL-MCNC: 75 MG/DL — SIGNIFICANT CHANGE UP
TROPONIN T, HIGH SENSITIVITY RESULT: 89 NG/L — CRITICAL HIGH
TROPONIN T, HIGH SENSITIVITY RESULT: 94 NG/L — CRITICAL HIGH
TROPONIN T, HIGH SENSITIVITY RESULT: 96 NG/L — CRITICAL HIGH
TSH SERPL-MCNC: 1.85 UIU/ML — SIGNIFICANT CHANGE UP (ref 0.27–4.2)
WBC # BLD: 4.47 K/UL — SIGNIFICANT CHANGE UP (ref 3.8–10.5)
WBC # FLD AUTO: 4.47 K/UL — SIGNIFICANT CHANGE UP (ref 3.8–10.5)

## 2021-08-28 PROCEDURE — 99291 CRITICAL CARE FIRST HOUR: CPT

## 2021-08-28 PROCEDURE — 99223 1ST HOSP IP/OBS HIGH 75: CPT

## 2021-08-28 PROCEDURE — 12345: CPT | Mod: NC

## 2021-08-28 PROCEDURE — 70450 CT HEAD/BRAIN W/O DYE: CPT | Mod: 26

## 2021-08-28 RX ORDER — LISINOPRIL 2.5 MG/1
5 TABLET ORAL DAILY
Refills: 0 | Status: DISCONTINUED | OUTPATIENT
Start: 2021-08-28 | End: 2021-09-03

## 2021-08-28 RX ORDER — LISINOPRIL 2.5 MG/1
1 TABLET ORAL
Qty: 0 | Refills: 0 | DISCHARGE

## 2021-08-28 RX ORDER — LABETALOL HCL 100 MG
5 TABLET ORAL EVERY 6 HOURS
Refills: 0 | Status: DISCONTINUED | OUTPATIENT
Start: 2021-08-28 | End: 2021-09-03

## 2021-08-28 RX ORDER — TAMSULOSIN HYDROCHLORIDE 0.4 MG/1
1 CAPSULE ORAL
Qty: 0 | Refills: 0 | DISCHARGE

## 2021-08-28 RX ORDER — LEVETIRACETAM 250 MG/1
500 TABLET, FILM COATED ORAL EVERY 12 HOURS
Refills: 0 | Status: DISCONTINUED | OUTPATIENT
Start: 2021-08-28 | End: 2021-09-03

## 2021-08-28 RX ORDER — SODIUM CHLORIDE 9 MG/ML
1000 INJECTION INTRAMUSCULAR; INTRAVENOUS; SUBCUTANEOUS
Refills: 0 | Status: DISCONTINUED | OUTPATIENT
Start: 2021-08-28 | End: 2021-08-28

## 2021-08-28 RX ORDER — AMLODIPINE BESYLATE 2.5 MG/1
5 TABLET ORAL DAILY
Refills: 0 | Status: DISCONTINUED | OUTPATIENT
Start: 2021-08-28 | End: 2021-09-03

## 2021-08-28 RX ORDER — CEFTRIAXONE 500 MG/1
1000 INJECTION, POWDER, FOR SOLUTION INTRAMUSCULAR; INTRAVENOUS EVERY 24 HOURS
Refills: 0 | Status: COMPLETED | OUTPATIENT
Start: 2021-08-28 | End: 2021-08-30

## 2021-08-28 RX ORDER — CHLORHEXIDINE GLUCONATE 213 G/1000ML
1 SOLUTION TOPICAL
Refills: 0 | Status: DISCONTINUED | OUTPATIENT
Start: 2021-08-28 | End: 2021-08-30

## 2021-08-28 RX ORDER — LABETALOL HCL 100 MG
10 TABLET ORAL ONCE
Refills: 0 | Status: COMPLETED | OUTPATIENT
Start: 2021-08-28 | End: 2021-08-28

## 2021-08-28 RX ORDER — LISINOPRIL 2.5 MG/1
10 TABLET ORAL DAILY
Refills: 0 | Status: DISCONTINUED | OUTPATIENT
Start: 2021-08-28 | End: 2021-08-28

## 2021-08-28 RX ADMIN — AMLODIPINE BESYLATE 5 MILLIGRAM(S): 2.5 TABLET ORAL at 03:16

## 2021-08-28 RX ADMIN — LEVETIRACETAM 400 MILLIGRAM(S): 250 TABLET, FILM COATED ORAL at 19:10

## 2021-08-28 RX ADMIN — CEFTRIAXONE 100 MILLIGRAM(S): 500 INJECTION, POWDER, FOR SOLUTION INTRAMUSCULAR; INTRAVENOUS at 22:55

## 2021-08-28 RX ADMIN — LISINOPRIL 10 MILLIGRAM(S): 2.5 TABLET ORAL at 03:16

## 2021-08-28 RX ADMIN — Medication 10 MILLIGRAM(S): at 04:33

## 2021-08-28 NOTE — H&P ADULT - PROBLEM SELECTOR PLAN 6
1.  Name of PCP: Dr. Ruano  2.  PCP Contacted on Admission: [ ] Y    [X ] N    3.  PCP contacted at Discharge: [ ] Y    [ ] N    [ ] N/A  4.  Post-Discharge Appointment Date and Location:  5.  Summary of Handoff given to PCP: No pharm VTE px given subdural hematoma cont lisinopril  start amlodipine also cont lisinopril 10mg qd, start amlodipine 5mg qd also - given overnight. Also given 10 IV labetalol  IV labetalol for SBP >160  Goal SBP <160 to normotension for SDH for now. SDH is age-indeterminate on read, possibly chronic from prior fall months ago? though unclear

## 2021-08-28 NOTE — H&P ADULT - PROBLEM SELECTOR PLAN 2
Follow UC  s/p ceftriaxone --> cont ceftriaxone Dysuria, positive UA  Follow UC  s/p ceftriaxone --> cont ceftriaxone

## 2021-08-28 NOTE — H&P ADULT - NSHPLABSRESULTS_GEN_ALL_CORE
12.4   4.35  )-----------( 170      ( 27 Aug 2021 20:34 )             38.3       08-27    141  |  106  |  18  ----------------------------<  89  4.4   |  23  |  1.32<H>    Ca    9.4      27 Aug 2021 20:34    TPro  7.0  /  Alb  3.7  /  TBili  0.7  /  DBili  x   /  AST  16  /  ALT  12  /  AlkPhos  61  08-27    < from: CT Head No Cont (08.27.21 @ 22:05) >      There is an age indeterminant left frontoparietal subdural hematoma. There is local mass effect with effacement of the sulci in the left frontal, parietal, and temporal lobes. There is subfalcine herniation with a 7 mm midline shift. There is no parenchymal hemorrhage    < end of copied text > EKG: Sinus with PAC QTC: 494 TWI in AVL, AVR    12.4   4.35  )-----------( 170      ( 27 Aug 2021 20:34 )             38.3       08-27    141  |  106  |  18  ----------------------------<  89  4.4   |  23  |  1.32<H>    Ca    9.4      27 Aug 2021 20:34    TPro  7.0  /  Alb  3.7  /  TBili  0.7  /  DBili  x   /  AST  16  /  ALT  12  /  AlkPhos  61  08-27    < from: CT Head No Cont (08.27.21 @ 22:05) >      There is an age indeterminant left frontoparietal subdural hematoma. There is local mass effect with effacement of the sulci in the left frontal, parietal, and temporal lobes. There is subfalcine herniation with a 7 mm midline shift. There is no parenchymal hemorrhage    < end of copied text > EKG interpretation: Sinus with PAC QTC: 494 TWI in AVL, AVR    Labs personally reviewed by me:    12.4   4.35  )-----------( 170      ( 27 Aug 2021 20:34 )             38.3       08-27    141  |  106  |  18  ----------------------------<  89  4.4   |  23  |  1.32<H>    Ca    9.4      27 Aug 2021 20:34    TPro  7.0  /  Alb  3.7  /  TBili  0.7  /  DBili  x   /  AST  16  /  ALT  12  /  AlkPhos  61  08-27      Imaging personally reviewed by me:  < from: CT Head No Cont (08.27.21 @ 22:05) >      There is an age indeterminant left frontoparietal subdural hematoma. There is local mass effect with effacement of the sulci in the left frontal, parietal, and temporal lobes. There is subfalcine herniation with a 7 mm midline shift. There is no parenchymal hemorrhage    < end of copied text >    This is stable from Primo Sinclair report in chart reportedly done 3:15PM per NSx EKG personally reviewed by me and interpretation: Sinus with PAC QTC: 494. No ST depressions or elevations    Labs personally reviewed by me:    12.4   4.35  )-----------( 170      ( 27 Aug 2021 20:34 )             38.3       08-27    141  |  106  |  18  ----------------------------<  89  4.4   |  23  |  1.32<H>    Ca    9.4      27 Aug 2021 20:34    TPro  7.0  /  Alb  3.7  /  TBili  0.7  /  DBili  x   /  AST  16  /  ALT  12  /  AlkPhos  61  08-27      Imaging personally reviewed by me:  < from: CT Head No Cont (08.27.21 @ 22:05) >      There is an age indeterminant left frontoparietal subdural hematoma. There is local mass effect with effacement of the sulci in the left frontal, parietal, and temporal lobes. There is subfalcine herniation with a 7 mm midline shift. There is no parenchymal hemorrhage    < end of copied text >    This is stable from Primo Sinclair report in chart reportedly done 3:15PM per NSx

## 2021-08-28 NOTE — RAPID RESPONSE TEAM SUMMARY - NSOTHERINTERVENTIONSRRT_GEN_ALL_CORE
Pt was orthostat positive.  Likely he was unable to correct for the fluid shift when changing position and suffered a syncope 2/2 brain hypoperfusion.  However, given his incontinence and the significant CTH findings, despite what would be a post-ictal period, I recommend vEEG at this time.  If pt is seen in convulsions, then he should be started on Keppra loading dose.  Otherwise, neurosurgery recs can be followed.  As VSS and pt has returned closed baseline mental status, he can proceed with his medicine admission pending recs from [facility].      Harsh Johnston MD  Internal Medicine, PGY3  [place] [id number]  pager: [phone]  Pt was orthostat positive.  Likely he was unable to correct for the fluid shift when changing position and suffered a syncope 2/2 brain hypoperfusion.  However, given his incontinence and the significant CTH findings, despite what would be a post-ictal period, I recommend vEEG at this time.  If pt is seen in convulsions, then he should be started on Keppra loading dose.  Otherwise, neurosurgery recs can be followed.  As VSS and pt has returned closed baseline mental status, he can proceed with his medicine admission pending recs from [facility].  Falls precautions.  Freq neuro checks.      Harsh Johnston MD  Internal Medicine, PGY3  [place] [id number]  pager: [phone]

## 2021-08-28 NOTE — ED ADULT NURSE REASSESSMENT NOTE - NS ED NURSE REASSESS COMMENT FT1
Pt received A&OX2, not oriented to time with periods of confusion. Respirations even and unlabored. Report given to MICU RN Poncho. Pt transported at this time.
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
## 2021-08-28 NOTE — H&P ADULT - NEUROLOGICAL DETAILS
cranial nerves intact/normal strength responds to verbal commands/sensation intact/cranial nerves intact/normal strength

## 2021-08-28 NOTE — CHART NOTE - NSCHARTNOTEFT_GEN_A_CORE
responded to RRT called by RN for AMS    88 y/o M with hx of HTN, presents with SDH, likely s/p fall in May of this year.  Per wife pt has acted strangely since his fall. CT head showed subdural hematoma w/7mm midline shift, repeated 8/28 w/o significant change.  Neurosurgery consulted with no acute intervention @ present however they are following and the patient may be a candidate for embolization.    On arrival, pt found sitting in stretcher.  Per nurse who witnessed event, pt was being stood up when he wanted to sit back down.  When he sat back down, he slumped into the stretcher with his eyes rolled back.  However, he came to with vigorous sternal rub and is now cracking jokes with the RRT team.  On monitor, pt's BP initially read as 173/91 but was in the 130s/80s on repeated measurements.  FS >100.  SpO2 98% RA.  RR ~16, breathing comfortably.  Pt able to say there was a cloudiness that came over him prior to his episode, but denies feeling foggy afterwards.  It was noticed that he had lost control of his bladder and soiled his stretcher bedding.  Lungs were clear.  Neuro exam was non-focal, although pt was not able to fully narrate the reason why he's being admitted.  No tongue trauma.  Rhythm was NS on tele.    Orthostatics +   VEEG ordered  discussed w/neuro and placed on keppra 500 BID for seizure ppx   MICU consulted  Cardiology consulted    strict bedrest, discussed w/attending, will follow up consult official recs

## 2021-08-28 NOTE — H&P ADULT - RESPIRATORY
Assisted pt up to bathroom; pt voided 200 mls bloody urine; states pain now a 6/10 to abdomen/cramping; pt assisted back to bed with side rails up x 2; vs stable;   detailed exam
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
## 2021-08-28 NOTE — PROGRESS NOTE ADULT - SUBJECTIVE AND OBJECTIVE BOX
Patient is a 87y old  Male who presents with a chief complaint of subdural hematoma (28 Aug 2021 12:48)      SUBJECTIVE / OVERNIGHT EVENTS:  resting in bed in Ed  very pleasant man, ox2- not sure of the year    MEDICATIONS  (STANDING):  cefTRIAXone   IVPB 1000 milliGRAM(s) IV Intermittent every 24 hours  lisinopril 10 milliGRAM(s) Oral daily    MEDICATIONS  (PRN):  labetalol Injectable 5 milliGRAM(s) IV Push every 6 hours PRN Systolic blood pressure >160    Vital Signs Last 24 Hrs  T(C): 37.3 (28 Aug 2021 11:36), Max: 37.3 (28 Aug 2021 01:18)  T(F): 99.1 (28 Aug 2021 11:36), Max: 99.1 (28 Aug 2021 01:18)  HR: 84 (28 Aug 2021 11:36) (81 - 101)  BP: 153/87 (28 Aug 2021 11:36) (141/63 - 177/100)  BP(mean): --  RR: 17 (28 Aug 2021 11:36) (16 - 18)  SpO2: 100% (28 Aug 2021 11:36) (98% - 100%)    PHYSICAL EXAM:  GENERAL: NAD, well-developed  HEAD:  Atraumatic, Normocephalic  EYES: EOMI, PERRLA, conjunctiva and sclera clear  NECK: Supple, No JVD  CHEST/LUNG: Clear to auscultation bilaterally; No wheeze  HEART: Regular rate and rhythm; No murmurs, rubs, or gallops  ABDOMEN: Soft, Nontender, Nondistended; Bowel sounds present  EXTREMITIES:  2+ Peripheral Pulses, No clubbing, cyanosis, or edema  PSYCH: AAOx2  NEUROLOGY: non-focal  SKIN: No rashes or lesions    LABS:                        12.7   4.47  )-----------( 173      ( 28 Aug 2021 07:21 )             38.1     08-    142  |  104  |  16  ----------------------------<  96  4.5   |  23  |  1.23    Ca    9.2      28 Aug 2021 07:21  Phos  2.9     -  Mg     2.10         TPro  7.0  /  Alb  3.7  /  TBili  0.7  /  DBili  x   /  AST  16  /  ALT  12  /  AlkPhos  61      PT/INR - ( 28 Aug 2021 07:21 )   PT: 13.2 sec;   INR: 1.17 ratio         PTT - ( 28 Aug 2021 07:21 )  PTT:34.6 sec  CARDIAC MARKERS ( 28 Aug 2021 07:21 )  x     / x     / 114 U/L / x     / 3.3 ng/mL  CARDIAC MARKERS ( 28 Aug 2021 05:25 )  x     / x     / 102 U/L / x     / 3.0 ng/mL  CARDIAC MARKERS ( 27 Aug 2021 20:43 )  x     / x     / 80 U/L / x     / x          Urinalysis Basic - ( 27 Aug 2021 21:08 )    Color: Yellow / Appearance: Slightly Turbid / S.014 / pH: x  Gluc: x / Ketone: Trace  / Bili: Negative / Urobili: <2 mg/dL   Blood: x / Protein: 30 mg/dL / Nitrite: Positive   Leuk Esterase: Large / RBC: 1 /HPF /  /HPF   Sq Epi: x / Non Sq Epi: 0 /HPF / Bacteria: Negative        RADIOLOGY & ADDITIONAL TESTS:  a< from: CT Head No Cont (21 @ 10:14) >  IMPRESSION:  Unchanged exam compared to prior from 2021.    < end of copied text >  < from: CT Head No Cont (21 @ 22:05) >  IMPRESSION:    There is an age indeterminant left frontoparietal subdural hematoma. There is local mass effect with effacement of the sulci in the left frontal, parietal, and temporal lobes. There is subfalcine herniation with a 7 mm midline shift. There is no parenchymal hemorrhage    < end of copied text >

## 2021-08-28 NOTE — ED ADULT NURSE REASSESSMENT NOTE - NS ED NURSE REASSESS COMMENT FT1
Break RN: rapid response initiated on pt due to unresponsiveness. During attempt to put pt back into stretch, pt became unresponsive and limp, unable to arouse pt via sternal rub for ~1 minute, pt had episode of urinary incontinence, + radial pulses. Pt transferred to trauma  A and became responsive to verbal stimuli, AxOx2,  pt placed on cardiac monitor ranging from NSR to sinus tachycardia, VS reassessed, rapid response team at bedside. Report endorsed to primary RN.
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
## 2021-08-28 NOTE — PROGRESS NOTE ADULT - PROBLEM SELECTOR PLAN 6
cont lisinopril 10mg qd, start amlodipine 5mg qd also - given overnight. Also given 10 IV labetalol  IV labetalol for SBP >160  Goal SBP <160 to normotension for SDH for now. SDH is age-indeterminate on read, possibly chronic from prior fall months ago? though unclear

## 2021-08-28 NOTE — H&P ADULT - ATTENDING COMMENTS
87M with PMHx HTN, presents from outpatient radiology with SDH on CTH. Patient with hx of syncope several months ago with unclear workup. Patient has been somewhat confused over the last few months and did not come to hospital when previously advised. Today found to have age indeterminant left frontoparietal subdural hematoma with a subfalcine herniation with a 7 mm midline shift 3:15 8/26. Here ~10PM with stable CTH. Unclear age of SDH - possibly chronic from prior fall but unclear at this time. Discussed with Neurosurgery with recs: ok to be monitored on medicine floor with neurochecks q4, SBP goal <160 to eventual normotension, CTH in 12 hours, no acute intervention at this time. SBPs at 160-170s overnight and given additional amlodipine 5mg, home lisinopril 10mg and labetalol 10 IV. Will continue with labetalol 5 IV q6hrs PRN sbp >160 and amlodipine 5mg qd and lisinopril 10mg qd with uptitration as needed. If persistently elevated >160s would consult CCU and start nicardipine gtt. Also found to have positive UA and will treat with CTX and f/u UCx. Incidentally found to have elevated troponin which may be related to CKD vs UTI. Will recheck, check echo given hx syncope if unable to obtain collateral and monitor on tele for now. Check CKMB as well.
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
## 2021-08-28 NOTE — PHYSICAL THERAPY INITIAL EVALUATION ADULT - ADDITIONAL COMMENTS
Patient lives with his wife in apartment, + elevator. Patient was independent prior to admission in [facility]. Patient was using cane prior to admission for ambulation as well as walker. Patient states his wife would be able to assist if needed at home.

## 2021-08-28 NOTE — H&P ADULT - PROBLEM SELECTOR PLAN 1
CTH showed:  There is an age indeterminant left frontoparietal subdural hematoma. There is local mass effect with effacement of the sulci in the left frontal, parietal, and temporal lobes. There is subfalcine herniation with a 7 mm midline shift. There is no parenchymal hemorrhage  Neurosurgery consulted by ED --> f/u recommendations  repeat CTH in AM  Neuro checks  Keep SBP <150  Fall risk  Hold ASA and other AC CTH showed:  There is an age indeterminant left frontoparietal subdural hematoma. There is local mass effect with effacement of the sulci in the left frontal, parietal, and temporal lobes. There is subfalcine herniation with a 7 mm midline shift. There is no parenchymal hemorrhage - read 10:23PM 8/27  Per NSx with access to [facility] CTH, imaging was done 8/26 3:15PM  -Will repeat CTH this AM at 10AM for 12 hour interval from one done here  -discussed with Neurosurgery and patient is ok to be monitored on medicine floor with neurochecks q4, SBP goal <160 to eventual normotension, CTH in 12 hours, no acute intervention at this time  - q4 Neuro checks  -Keep SBP <160  -Fall risk  -Hold ASA and other AC  Appears ASA is for primary prevention - would discontinue given bleeding risk

## 2021-08-28 NOTE — PROGRESS NOTE ADULT - PROBLEM SELECTOR PLAN 1
CTH showed:  There is an age indeterminant left frontoparietal subdural hematoma. There is local mass effect with effacement of the sulci in the left frontal, parietal, and temporal lobes. There is subfalcine herniation with a 7 mm midline shift. There is no parenchymal hemorrhage - read 10:23PM 8/27  Per NSx with access to [facility] CTH, imaging was done 8/26 3:15PM  -Will repeat CTH this AM at 10AM for 12 hour interval from one done here  -discussed with Neurosurgery and patient is ok to be monitored on medicine floor with neurochecks q4, SBP goal <160 to eventual normotension, CTH in 12 hours, no acute intervention at this time  - q4 Neuro checks  -Keep SBP <160  -Fall risk  -Hold ASA and other AC  Appears ASA is for primary prevention - would discontinue given bleeding risk

## 2021-08-28 NOTE — H&P ADULT - PROBLEM SELECTOR PLAN 3
cont lisinopril EKG showed QTC: 499  Monitor QTc closely  repeat EKG in AM  avoid qt prolonging agents Patient is chest pain free  Trend CE  EKG showed:EKG: Sinus with PAC QTC: 494 TWI in AVL, AVR Patient is chest pain free, low suspicion ACS. May be in the setting of mild CKD, repeat troponin. EKG nonischemic, monitor on tele.   EKG: Sinus with PAC QTC: 494 TWI in AVL, AVR

## 2021-08-28 NOTE — RAPID RESPONSE TEAM SUMMARY - NSSITUATIONBACKGROUNDRRT_GEN_ALL_CORE
86 y/o M with hx of HTN, presents with SDH, likely s/p fall in May of this year.  Per wife pt has acted strangely since his fall.  At PCP visit, pt was noted to be unsteady on his feet, but he declined his PCP's rec to go to the hospital.  He was referred to outpt CTH, which showed a SDH.  CT here showed SDH w/ 7 cm midline shift.  RRT called for lethargy/AMS.   86 y/o M with hx of HTN, presents with SDH, likely s/p fall in May of this year.  Per wife pt has acted strangely since his fall.  At PCP visit, pt was noted to be unsteady on his feet, but he declined his PCP's rec to go to the hospital.  He was referred to outpt CTH, which showed a SDH.  CT here showed SDH w/ 7 mm midline shift.  RRT called for lethargy/AMS.

## 2021-08-28 NOTE — PROGRESS NOTE ADULT - PROBLEM SELECTOR PLAN 3
Patient is chest pain free, low suspicion ACS. May be in the setting of mild CKD, repeat troponin. EKG nonischemic, monitor on tele.   EKG: Sinus with PAC QTC: 494 TWI in AVL, AVR Patient is chest pain free, low suspicion ACS. May be in the setting of mild CKD, repeat troponin. EKG nonischemic, monitor on tele.   EKG: Sinus with PAC QTC: 494 TWI in AVL, AVR  cardiology eval for elated troughs

## 2021-08-28 NOTE — H&P ADULT - PROBLEM SELECTOR PLAN 9
1.  Name of PCP: Dr. Ruano  2.  PCP Contacted on Admission: [ ] Y    [X ] N    3.  PCP contacted at Discharge: [ ] Y    [ ] N    [ ] N/A  4.  Post-Discharge Appointment Date and Location:  5.  Summary of Handoff given to PCP:

## 2021-08-28 NOTE — H&P ADULT - ASSESSMENT
88 y/o M with hx of HTN, presents with confusion. admitted for subdural hematoma 86 y/o M with hx of HTN, presents outpatient radiology with SDH
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
## 2021-08-28 NOTE — H&P ADULT - PROBLEM SELECTOR PLAN 7
1.  Name of PCP: Dr. Ruano  2.  PCP Contacted on Admission: [ ] Y    [X ] N    3.  PCP contacted at Discharge: [ ] Y    [ ] N    [ ] N/A  4.  Post-Discharge Appointment Date and Location:  5.  Summary of Handoff given to PCP: Monitor Cr  patient with hx of Cr in 1.3

## 2021-08-28 NOTE — H&P ADULT - PROBLEM SELECTOR PLAN 5
No pharm VTE px given subdural hematoma Monitor Cr  patient with hx of Cr in 1.3 EKG showed QTC: 499  Monitor QTc closely  repeat EKG in AM  avoid qt prolonging agents

## 2021-08-28 NOTE — H&P ADULT - MENTAL STATUS
A&Ox1-2 [knows his name and knows he is in hospital] He does not know the date or the name of the hospital. A&Ox2 [knows his name and knows he is in hospital] He does not know the date or the name of the hospital. Per wife, he has been like this for a month

## 2021-08-28 NOTE — CHART NOTE - NSCHARTNOTEFT_GEN_A_CORE
patient had Rapid Resp called after he was found walking and helped back to bed by RN  He had few sec LOC and was alert again  He already has Norvasc/ Lisipopril and Iv Labetolol in ED  Rn  and patient notified that he most stay in bed  Rn noted Laying  Bp 131/83 and standing Bp 103/93 patient had Rapid Resp called after he was found walking and helped back to bed by RN  He had few sec LOC and was alert again  He already has Norvasc/ Lisipopril and Iv Labetolol in ED  Rn  and patient notified that he most stay in bed  Rn noted Laying  Bp 131/83 and standing Bp 103/93  A/p Orthostatic Bp causing transient LOC Vs Seizure   Agree with starting Keppra as rec by NP/ Neuro surgery PA, also can get EEG  Clarify Bp meds with Neuro-surgery at 00[address]- Norvasc 5 mg qd  and Lisinopril 5 mg qd, hold sbp <120, hr <60   No need to repeat Ct of had   D/w patient and Rn- bed rest for now with compression dev for DVT patient had Rapid Resp called after he was found walking and helped back to bed by RN  He had few sec LOC and was alert again  He already has Norvasc/ Lisipopril and Iv Labetolol in ED  Rn  and patient notified that he most stay in bed  Rn noted Laying  Bp 131/83 and standing Bp 103/93  A/p Orthostatic Bp causing transient LOC Vs Seizure   Agree with starting Keppra as rec by NP/ Neuro surgery PA, also can get EEG  Clarify B[place] meds with Neuro-surgery at [id number]- Norvasc 5 mg qd  and Lisinopril 5 mg qd, hold sbp <120, hr <60   No need to repeat Ct of had   D/w patient and Rn- bed rest for now with compression dev for DVT    Plan d/w Np  Any more LOC - please get ICU eval
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
## 2021-08-29 LAB
ALBUMIN SERPL ELPH-MCNC: 3 G/DL — LOW (ref 3.3–5)
ALP SERPL-CCNC: 56 U/L — SIGNIFICANT CHANGE UP (ref 40–120)
ALT FLD-CCNC: 11 U/L — SIGNIFICANT CHANGE UP (ref 4–41)
ANION GAP SERPL CALC-SCNC: 11 MMOL/L — SIGNIFICANT CHANGE UP (ref 7–14)
APTT BLD: 27.1 SEC — SIGNIFICANT CHANGE UP (ref 27–36.3)
AST SERPL-CCNC: 15 U/L — SIGNIFICANT CHANGE UP (ref 4–40)
BILIRUB SERPL-MCNC: 0.4 MG/DL — SIGNIFICANT CHANGE UP (ref 0.2–1.2)
BUN SERPL-MCNC: 22 MG/DL — SIGNIFICANT CHANGE UP (ref 7–23)
CALCIUM SERPL-MCNC: 9 MG/DL — SIGNIFICANT CHANGE UP (ref 8.4–10.5)
CHLORIDE SERPL-SCNC: 107 MMOL/L — SIGNIFICANT CHANGE UP (ref 98–107)
CO2 SERPL-SCNC: 23 MMOL/L — SIGNIFICANT CHANGE UP (ref 22–31)
CREAT SERPL-MCNC: 1.41 MG/DL — HIGH (ref 0.5–1.3)
CULTURE RESULTS: SIGNIFICANT CHANGE UP
GLUCOSE SERPL-MCNC: 123 MG/DL — HIGH (ref 70–99)
HCT VFR BLD CALC: 35.1 % — LOW (ref 39–50)
HGB BLD-MCNC: 11.6 G/DL — LOW (ref 13–17)
INR BLD: 1.16 RATIO — SIGNIFICANT CHANGE UP (ref 0.88–1.16)
MAGNESIUM SERPL-MCNC: 2.2 MG/DL — SIGNIFICANT CHANGE UP (ref 1.6–2.6)
MCHC RBC-ENTMCNC: 28.2 PG — SIGNIFICANT CHANGE UP (ref 27–34)
MCHC RBC-ENTMCNC: 33 GM/DL — SIGNIFICANT CHANGE UP (ref 32–36)
MCV RBC AUTO: 85.4 FL — SIGNIFICANT CHANGE UP (ref 80–100)
NRBC # BLD: 0 /100 WBCS — SIGNIFICANT CHANGE UP
NRBC # FLD: 0 K/UL — SIGNIFICANT CHANGE UP
PHOSPHATE SERPL-MCNC: 3.5 MG/DL — SIGNIFICANT CHANGE UP (ref 2.5–4.5)
PLATELET # BLD AUTO: 213 K/UL — SIGNIFICANT CHANGE UP (ref 150–400)
POTASSIUM SERPL-MCNC: 4 MMOL/L — SIGNIFICANT CHANGE UP (ref 3.5–5.3)
POTASSIUM SERPL-SCNC: 4 MMOL/L — SIGNIFICANT CHANGE UP (ref 3.5–5.3)
PROT SERPL-MCNC: 6.1 G/DL — SIGNIFICANT CHANGE UP (ref 6–8.3)
PROTHROM AB SERPL-ACNC: 13.2 SEC — SIGNIFICANT CHANGE UP (ref 10.6–13.6)
RBC # BLD: 4.11 M/UL — LOW (ref 4.2–5.8)
RBC # FLD: 14 % — SIGNIFICANT CHANGE UP (ref 10.3–14.5)
SODIUM SERPL-SCNC: 141 MMOL/L — SIGNIFICANT CHANGE UP (ref 135–145)
SPECIMEN SOURCE: SIGNIFICANT CHANGE UP
WBC # BLD: 6.02 K/UL — SIGNIFICANT CHANGE UP (ref 3.8–10.5)
WBC # FLD AUTO: 6.02 K/UL — SIGNIFICANT CHANGE UP (ref 3.8–10.5)

## 2021-08-29 PROCEDURE — 99291 CRITICAL CARE FIRST HOUR: CPT

## 2021-08-29 PROCEDURE — 99232 SBSQ HOSP IP/OBS MODERATE 35: CPT

## 2021-08-29 PROCEDURE — 93306 TTE W/DOPPLER COMPLETE: CPT | Mod: 26

## 2021-08-29 PROCEDURE — 99233 SBSQ HOSP IP/OBS HIGH 50: CPT

## 2021-08-29 RX ADMIN — LEVETIRACETAM 400 MILLIGRAM(S): 250 TABLET, FILM COATED ORAL at 18:20

## 2021-08-29 RX ADMIN — AMLODIPINE BESYLATE 5 MILLIGRAM(S): 2.5 TABLET ORAL at 05:59

## 2021-08-29 RX ADMIN — LISINOPRIL 5 MILLIGRAM(S): 2.5 TABLET ORAL at 05:59

## 2021-08-29 RX ADMIN — CEFTRIAXONE 100 MILLIGRAM(S): 500 INJECTION, POWDER, FOR SOLUTION INTRAMUSCULAR; INTRAVENOUS at 23:06

## 2021-08-29 RX ADMIN — LEVETIRACETAM 400 MILLIGRAM(S): 250 TABLET, FILM COATED ORAL at 05:59

## 2021-08-29 RX ADMIN — CHLORHEXIDINE GLUCONATE 1 APPLICATION(S): 213 SOLUTION TOPICAL at 06:05
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
## 2021-08-29 NOTE — PROGRESS NOTE ADULT - ASSESSMENT
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 orellana  - Will screen patient to determine if patient is a candidate for EMBOLIZE trial at [facility] (Cerebral angiogram and embolization of MMA to prevent reoccurrence of subdural) followed by Left burrhole for drainage. If patient is a candidate will need to be transferred to [facility] this week.   - Will follow up on timing with team. Procedure discussed with patient by nsx resident.

## 2021-08-29 NOTE — PROGRESS NOTE ADULT - SUBJECTIVE AND OBJECTIVE BOX
INTERVAL HPI/OVERNIGHT EVENTS:    SUBJECTIVE:   This is 87y Male Hospital Day     OBJECTIVE:    VITAL SIGNS:  ICU Vital Signs Last 24 Hrs  T(C): 35.7 (29 Aug 2021 04:00), Max: 37.3 (28 Aug 2021 11:36)  T(F): 96.2 (29 Aug 2021 04:00), Max: 99.1 (28 Aug 2021 11:36)  HR: 70 (29 Aug 2021 07:00) (70 - 90)  BP: 128/56 (29 Aug 2021 07:00) (103/59 - 153/87)  BP(mean): 75 (29 Aug 2021 07:00) (63 - 89)  ABP: --  ABP(mean): --  RR: 14 (29 Aug 2021 07:00) (12 - 20)  SpO2: 96% (29 Aug 2021 07:00) (96% - 100%)         @ 07:01  -   @ 07:00  --------------------------------------------------------  IN: 50 mL / OUT: 400 mL / NET: -350 mL          PHYSICAL EXAM:  GENERAL: NAD, well-groomed, well-developed  HEAD:  Atraumatic, Normocephalic  EYES: EOMI, PERRLA, conjunctiva and sclera clear  ENMT: No tonsillar erythema, exudates, or enlargement; Moist mucous membranes, Good dentition, No lesions  NECK: Supple, No JVD, Normal thyroid  CHEST/LUNG: Clear to auscultation bilaterally; No rales, rhonchi, wheezing, or rubs  HEART: Regular rate and rhythm; No murmurs, rubs, or gallops  ABDOMEN: Soft, Nontender, Nondistended; Bowel sounds present  EXTREMITIES:  2+ Peripheral Pulses, No clubbing, cyanosis, or edema  LYMPH: No lymphadenopathy noted  SKIN: No rashes or lesions  NERVOUS SYSTEM:  Alert & Oriented X4, Good concentration  PSYCH: Normal Affect. Speaking in Full Sentences. Laying in bed comfortably; not agitated   LINES:                                       MEDICATIONS:  MEDICATIONS  (STANDING):  amLODIPine   Tablet 5 milliGRAM(s) Oral daily  cefTRIAXone   IVPB 1000 milliGRAM(s) IV Intermittent every 24 hours  chlorhexidine 4% Liquid 1 Application(s) Topical <User Schedule>  levETIRAcetam  IVPB 500 milliGRAM(s) IV Intermittent every 12 hours  lisinopril 5 milliGRAM(s) Oral daily    MEDICATIONS  (PRN):  labetalol Injectable 5 milliGRAM(s) IV Push every 6 hours PRN Systolic blood pressure >160      ALLERGIES:  Allergies    No Known Allergies    Intolerances        LABS:                        11.6   6.02  )-----------( 213      ( 29 Aug 2021 02:11 )             35.1         141  |  107  |  22  ----------------------------<  123<H>  4.0   |  23  |  1.41<H>    Ca    9.0      29 Aug 2021 02:11  Phos  3.5       Mg     2.20         TPro  6.1  /  Alb  3.0<L>  /  TBili  0.4  /  DBili  x   /  AST  15  /  ALT  11  /  AlkPhos  56  -    PT/INR - ( 29 Aug 2021 02:11 )   PT: 13.2 sec;   INR: 1.16 ratio         PTT - ( 29 Aug 2021 02:11 )  PTT:27.1 sec  Urinalysis Basic - ( 27 Aug 2021 21:08 )    Color: Yellow / Appearance: Slightly Turbid / S.014 / pH: x  Gluc: x / Ketone: Trace  / Bili: Negative / Urobili: <2 mg/dL   Blood: x / Protein: 30 mg/dL / Nitrite: Positive   Leuk Esterase: Large / RBC: 1 /HPF /  /HPF   Sq Epi: x / Non Sq Epi: 0 /HPF / Bacteria: Negative        CAPILLARY BLOOD GLUCOSE          RADIOLOGY & ADDITIONAL TESTS: Reviewed. INTERVAL HPI/OVERNIGHT EVENTS:    SUBJECTIVE:   This is 87y Male Hospital Day   Spoke with Patient at Bedside. No acute events overnight. Mild chronic headache. Patient denies F/N/V/CP/SOB/Abd Pain/D/Dysuria/Swelling.     OBJECTIVE:    VITAL SIGNS:  ICU Vital Signs Last 24 Hrs  T(C): 35.7 (29 Aug 2021 04:00), Max: 37.3 (28 Aug 2021 11:36)  T(F): 96.2 (29 Aug 2021 04:00), Max: 99.1 (28 Aug 2021 11:36)  HR: 70 (29 Aug 2021 07:00) (70 - 90)  BP: 128/56 (29 Aug 2021 07:00) (103/59 - 153/87)  BP(mean): 75 (29 Aug 2021 07:00) (63 - 89)  ABP: --  ABP(mean): --  RR: 14 (29 Aug 2021 07:00) (12 - 20)  SpO2: 96% (29 Aug 2021 07:00) (96% - 100%)         @ 07:01  -   @ 07:00  --------------------------------------------------------  IN: 50 mL / OUT: 400 mL / NET: -350 mL          PHYSICAL EXAM:  GENERAL: NAD, well-developed  HEAD:  Atraumatic, Normocephalic  EYES: EOMI, PERRLA, conjunctiva and sclera clear  NECK: Supple, No JVD  CHEST/LUNG: Clear to auscultation bilaterally; No wheeze  HEART: Regular rate and rhythm; No murmurs, rubs, or gallops  ABDOMEN: Soft, Nontender, Nondistended; Bowel sounds present  EXTREMITIES:  2+ Peripheral Pulses, No clubbing, cyanosis, or edema  PSYCH: AAOx2  NEUROLOGY: non-focal, LLE slightly weaker than RLE   SKIN: No rashes or lesions  LINES:  Left PIV                                      MEDICATIONS:  MEDICATIONS  (STANDING):  amLODIPine   Tablet 5 milliGRAM(s) Oral daily  cefTRIAXone   IVPB 1000 milliGRAM(s) IV Intermittent every 24 hours  chlorhexidine 4% Liquid 1 Application(s) Topical <User Schedule>  levETIRAcetam  IVPB 500 milliGRAM(s) IV Intermittent every 12 hours  lisinopril 5 milliGRAM(s) Oral daily    MEDICATIONS  (PRN):  labetalol Injectable 5 milliGRAM(s) IV Push every 6 hours PRN Systolic blood pressure >160      ALLERGIES:  Allergies    No Known Allergies    Intolerances        LABS:                        11.6   6.02  )-----------( 213      ( 29 Aug 2021 02:11 )             35.1         141  |  107  |  22  ----------------------------<  123<H>  4.0   |  23  |  1.41<H>    Ca    9.0      29 Aug 2021 02:11  Phos  3.5       Mg     2.20         TPro  6.1  /  Alb  3.0<L>  /  TBili  0.4  /  DBili  x   /  AST  15  /  ALT  11  /  AlkPhos  56      PT/INR - ( 29 Aug 2021 02:11 )   PT: 13.2 sec;   INR: 1.16 ratio         PTT - ( 29 Aug 2021 02:11 )  PTT:27.1 sec  Urinalysis Basic - ( 27 Aug 2021 21:08 )    Color: Yellow / Appearance: Slightly Turbid / S.014 / pH: x  Gluc: x / Ketone: Trace  / Bili: Negative / Urobili: <2 mg/dL   Blood: x / Protein: 30 mg/dL / Nitrite: Positive   Leuk Esterase: Large / RBC: 1 /HPF /  /HPF   Sq Epi: x / Non Sq Epi: 0 /HPF / Bacteria: Negative        CAPILLARY BLOOD GLUCOSE          RADIOLOGY & ADDITIONAL TESTS: Reviewed.

## 2021-08-29 NOTE — PROGRESS NOTE ADULT - SUBJECTIVE AND OBJECTIVE BOX
SUBJECTIVE EVENTS: ? seizure last night  Appears back to baseline today    Vital Signs Last 24 Hrs  T(C): 35.8 (29 Aug 2021 08:00), Max: 37.3 (28 Aug 2021 11:36)  T(F): 96.4 (29 Aug 2021 08:00), Max: 99.1 (28 Aug 2021 11:36)  HR: 74 (29 Aug 2021 09:00) (67 - 90)  BP: 139/66 (29 Aug 2021 09:00) (103/59 - 153/87)  BP(mean): 84 (29 Aug 2021 09:00) (63 - 89)  RR: 13 (29 Aug 2021 09:00) (12 - 20)  SpO2: 97% (29 Aug 2021 09:00) (96% - 100%)      PHYSICAL EXAM:  Awake Alert Age Appopriate  PERRL, EOMI, No facial droop, Tongue midline  Normal Tone 5/5 strength equally      DIET:      MEDICATIONS  (STANDING):  amLODIPine   Tablet 5 milliGRAM(s) Oral daily  cefTRIAXone   IVPB 1000 milliGRAM(s) IV Intermittent every 24 hours  chlorhexidine 4% Liquid 1 Application(s) Topical <User Schedule>  levETIRAcetam  IVPB 500 milliGRAM(s) IV Intermittent every 12 hours  lisinopril 5 milliGRAM(s) Oral daily    MEDICATIONS  (PRN):  labetalol Injectable 5 milliGRAM(s) IV Push every 6 hours PRN Systolic blood pressure >160                            11.6   6.02  )-----------( 213      ( 29 Aug 2021 02:11 )             35.1   08-    141  |  107  |  22  ----------------------------<  123<H>  4.0   |  23  |  1.41<H>    Ca    9.0      29 Aug 2021 02:11  Phos  3.5     -  Mg     2.20     -    TPro  6.1  /  Alb  3.0<L>  /  TBili  0.4  /  DBili  x   /  AST  15  /  ALT  11  /  AlkPhos  56  08-29  PT/INR - ( 29 Aug 2021 02:11 )   PT: 13.2 sec;   INR: 1.16 ratio         PTT - ( 29 Aug 2021 02:11 )  PTT:27.1 secUrinalysis Basic - ( 27 Aug 2021 21:08 )    Color: Yellow / Appearance: Slightly Turbid / S.014 / pH: x  Gluc: x / Ketone: Trace  / Bili: Negative / Urobili: <2 mg/dL   Blood: x / Protein: 30 mg/dL / Nitrite: Positive   Leuk Esterase: Large / RBC: 1 /HPF /  /HPF   Sq Epi: x / Non Sq Epi: 0 /HPF / Bacteria: Negative          RADIOLGY:   < from: CT Head No Cont (21 @ 10:14) >  EXAM:  CT BRAIN        PROCEDURE DATE:  Aug 28 2021         INTERPRETATION:  CLINICAL INFORMATION: Follow-up subdural hematoma.    TECHNIQUE: Noncontrast axial CT images were acquired through the head. Two-dimensional sagittal and coronal reformatswere generated.    COMPARISON STUDY: Head CT 2021    FINDINGS:  Unchanged left frontoparietal mixed density extra-axial collection which measures up to 1.7 cm in greatest depth. Localized mass effect is seen consistent with sulcal effacement. There is associated mass effect upon the left lateral ventricle and a 7 mm left-to-right midline shift.    Abnormal low attenuation is seen involving the inferior right parietal cortical subcortical region which is unchanged. 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 No parenchymal hemorrhage or hydrocephalus.    Polyp versus retention cyst within the left sphenoid sinus. Visualized paranasal sinuses are otherwise clear. The mastoid air cells and middle ear cavities are clear.    The soft tissues of the scalp are unremarkable. The calvarium is intact.    IMPRESSION:  Unchanged exam compared to prior from 2021.    < end of copied text >

## 2021-08-29 NOTE — CHART NOTE - NSCHARTNOTEFT_GEN_A_CORE
MAR Accept Note    Transfer to:    Accepting Attending Physician: Emanuel  Assigned Room:      Patient seen and examined.   Labs and data reviewed.   No findings precluding transfer of service.     HPI/MICU COURSE:   Please refer to MICU transfer note for full details. Briefly, this is a 87M with hx of HTN, who presented 8/27 with SDH c/b 7 mm midline shift, likely 2/2 fall in May of this year.  He had an RRT called for acute change in mental status.  During period of encephalopathy, pt loss control of his bladder.  Keppra started for seizure ppx.  Sent to MICU for Q1H neuro checks.  Pt's tenure in the MICU has been uneventful, and pt can be de-escalated to routine neuro checks.  Stable and appropriate for transfer to floor for rest of his workup.    On interview, pt is pleasant but has completely forgotten our many interactions yesterday.  AAOx1 (knows name) but can hold conversation.  No FND on rest of exam.          FOR FOLLOW-UP:  [ ] F/u Neurosurgery recs, may be candidate for embolization trial, c/w keppra   [ ] c/w ceftriaxone for UTI based on cultures.    Harsh Johnston  PGY3  MAR [id number]  [facility] [id number] / [facility] [id number] MAR Accept Note    Transfer to:    Accepting Attending Physician: Emanuel  Assigned Room:      Patient seen and examined.   Labs and data reviewed.   No findings precluding transfer of service.     HPI/MICU COURSE:   Please refer to MICU transfer note for full details. Briefly, this is a 87M with hx of HTN, who presented 8/27 with SDH c/b 7 mm midline shift, likely 2/2 fall in May of this year.  He had an RRT called for acute change in mental status.  During period of encephalopathy, pt loss control of his bladder.  Keppra started for seizure ppx.  Sent to MICU for Q1H neuro checks.  Pt's tenure in the MICU has been uneventful, and pt can be de-escalated to routine neuro checks.  Stable and appropriate for transfer to floor for rest of his workup.    On interview, pt is pleasant but has completely forgotten our many interactions from yesterday.  AAOx1 (knows name) but can hold conversation.  No FND on rest of exam.          FOR FOLLOW-UP:  [ ] F/u Neurosurgery recs, may be candidate for embolization trial, c/w keppra   [ ] c/w ceftriaxone for UTI based on cultures.    Harsh Johnston  PGY3  MAR [id number]  [facility] [address] / [facility] [id number]

## 2021-08-29 NOTE — CHART NOTE - NSCHARTNOTEFT_GEN_A_CORE
EEG reviewed until ~1330.    No seizures recorded.    Final report to be completed at the completion of the study tomorrow morning       Talon Collins MD  Epilepsy Fellow    Reading Room: [phone]  On Call Service After Hours: [phone] EEG reviewed until ~1330.    No seizures recorded.    Final report to be completed at the completion of the study tomorrow morning       Talon Collins MD  Epilepsy Fellow    Aki Holm MD PhD  Director, Epilepsy Division, [facility] EEG Reading Room Ph#: [phone]  Epilepsy Answering Service after 5PM and before 8:30AM: Ph#: [phone]

## 2021-08-30 ENCOUNTER — TRANSCRIPTION ENCOUNTER (OUTPATIENT)
Age: 86
End: 2021-08-30

## 2021-08-30 LAB
ALBUMIN SERPL ELPH-MCNC: 3.2 G/DL — LOW (ref 3.3–5)
ALP SERPL-CCNC: 56 U/L — SIGNIFICANT CHANGE UP (ref 40–120)
ALT FLD-CCNC: 11 U/L — SIGNIFICANT CHANGE UP (ref 4–41)
ANION GAP SERPL CALC-SCNC: 9 MMOL/L — SIGNIFICANT CHANGE UP (ref 7–14)
APTT BLD: 23.4 SEC — LOW (ref 27–36.3)
AST SERPL-CCNC: 14 U/L — SIGNIFICANT CHANGE UP (ref 4–40)
BILIRUB SERPL-MCNC: 0.2 MG/DL — SIGNIFICANT CHANGE UP (ref 0.2–1.2)
BUN SERPL-MCNC: 19 MG/DL — SIGNIFICANT CHANGE UP (ref 7–23)
CALCIUM SERPL-MCNC: 8.7 MG/DL — SIGNIFICANT CHANGE UP (ref 8.4–10.5)
CHLORIDE SERPL-SCNC: 106 MMOL/L — SIGNIFICANT CHANGE UP (ref 98–107)
CO2 SERPL-SCNC: 24 MMOL/L — SIGNIFICANT CHANGE UP (ref 22–31)
CREAT SERPL-MCNC: 0.97 MG/DL — SIGNIFICANT CHANGE UP (ref 0.5–1.3)
GLUCOSE SERPL-MCNC: 144 MG/DL — HIGH (ref 70–99)
HCT VFR BLD CALC: 37.2 % — LOW (ref 39–50)
HGB BLD-MCNC: 12.2 G/DL — LOW (ref 13–17)
INR BLD: 1.13 RATIO — SIGNIFICANT CHANGE UP (ref 0.88–1.16)
MAGNESIUM SERPL-MCNC: 2.2 MG/DL — SIGNIFICANT CHANGE UP (ref 1.6–2.6)
MCHC RBC-ENTMCNC: 27.9 PG — SIGNIFICANT CHANGE UP (ref 27–34)
MCHC RBC-ENTMCNC: 32.8 GM/DL — SIGNIFICANT CHANGE UP (ref 32–36)
MCV RBC AUTO: 84.9 FL — SIGNIFICANT CHANGE UP (ref 80–100)
NRBC # BLD: 0 /100 WBCS — SIGNIFICANT CHANGE UP
NRBC # FLD: 0 K/UL — SIGNIFICANT CHANGE UP
PHOSPHATE SERPL-MCNC: 3 MG/DL — SIGNIFICANT CHANGE UP (ref 2.5–4.5)
PLATELET # BLD AUTO: 194 K/UL — SIGNIFICANT CHANGE UP (ref 150–400)
POTASSIUM SERPL-MCNC: 4.1 MMOL/L — SIGNIFICANT CHANGE UP (ref 3.5–5.3)
POTASSIUM SERPL-SCNC: 4.1 MMOL/L — SIGNIFICANT CHANGE UP (ref 3.5–5.3)
PROT SERPL-MCNC: 6.2 G/DL — SIGNIFICANT CHANGE UP (ref 6–8.3)
PROTHROM AB SERPL-ACNC: 12.8 SEC — SIGNIFICANT CHANGE UP (ref 10.6–13.6)
RBC # BLD: 4.38 M/UL — SIGNIFICANT CHANGE UP (ref 4.2–5.8)
RBC # FLD: 14 % — SIGNIFICANT CHANGE UP (ref 10.3–14.5)
SODIUM SERPL-SCNC: 139 MMOL/L — SIGNIFICANT CHANGE UP (ref 135–145)
WBC # BLD: 5.84 K/UL — SIGNIFICANT CHANGE UP (ref 3.8–10.5)
WBC # FLD AUTO: 5.84 K/UL — SIGNIFICANT CHANGE UP (ref 3.8–10.5)

## 2021-08-30 PROCEDURE — 99233 SBSQ HOSP IP/OBS HIGH 50: CPT

## 2021-08-30 PROCEDURE — 99233 SBSQ HOSP IP/OBS HIGH 50: CPT | Mod: GC

## 2021-08-30 RX ORDER — SODIUM CHLORIDE 9 MG/ML
1000 INJECTION INTRAMUSCULAR; INTRAVENOUS; SUBCUTANEOUS
Refills: 0 | Status: DISCONTINUED | OUTPATIENT
Start: 2021-08-30 | End: 2021-09-01

## 2021-08-30 RX ADMIN — CEFTRIAXONE 100 MILLIGRAM(S): 500 INJECTION, POWDER, FOR SOLUTION INTRAMUSCULAR; INTRAVENOUS at 22:55

## 2021-08-30 RX ADMIN — CHLORHEXIDINE GLUCONATE 1 APPLICATION(S): 213 SOLUTION TOPICAL at 06:32

## 2021-08-30 RX ADMIN — AMLODIPINE BESYLATE 5 MILLIGRAM(S): 2.5 TABLET ORAL at 05:16

## 2021-08-30 RX ADMIN — LEVETIRACETAM 400 MILLIGRAM(S): 250 TABLET, FILM COATED ORAL at 18:53

## 2021-08-30 RX ADMIN — LISINOPRIL 5 MILLIGRAM(S): 2.5 TABLET ORAL at 05:16

## 2021-08-30 RX ADMIN — LEVETIRACETAM 400 MILLIGRAM(S): 250 TABLET, FILM COATED ORAL at 05:16

## 2021-08-30 NOTE — DIETITIAN INITIAL EVALUATION ADULT. - PERTINENT MEDS FT
March 22, 2021       Laith Aranda MD  [address] [facility] [address]  Via Fax: [phone]      Patient: Hai Cerda   YOB: 1934   Date of Visit: 3/22/2021       Dear Dr. Aranda:    Thank you for referring Hai Cerda to me for evaluation. Below are my notes for this visit with him.    If you have questions, please do not hesitate to call me. I look forward to following your patient along with you.      Sincerely,        Belkis Viramontes MD        CC: No Recipients  Belkis Viramontes MD  3/22/2021  8:54 AM  Signed  Subjective   Patient ID: Mr. Hai Cerda is a pleasant 86 year old gentleman    Chief Complaint   Patient presents with   • Follow-up   • Atrial Fibrillation   • Congestive Heart Failure       HPI:  Mr. Cerda is a pleasant 86-year-old gentleman with history of atrial fibrillation. He has been on Eliquis for anticoagulation and stroke prevention. Echocardiogram revealed an ejection fraction of 30 to 35%. However, repeat echocardiogram has improved ejection fraction of 85 to 50% with diastolic dysfunction and mild aortic insufficiency. Of note, he had a successful cardioversion with Dr. Rivera. The ascending aorta is mildly dilated at 4.3 cm. This is been stable. Other medical issues include hypertension and dyslipidemia.  He now follows up with Dr. Mcintosh.  He currently is doing well denies any angina or heart failure symptoms.  He does have some occasional exertional shortness of breath which may be more of an endurance issue and we suggested going in a walking program.    PMHx:  Past Medical History:   Diagnosis Date   • Atrial fibrillation (CMS/HCC)    • Combined systolic and diastolic cardiac dysfunction    • DVT (deep venous thrombosis) (CMS/HCC)     1970's   • Glaucoma    • History of lipoma    • Hx of blood clots    • Hypertension    • TIA (transient ischemic attack)     3/7/2013       PSH:  Past Surgical History:   Procedure Laterality Date   •  ------------other-------------      varies lipoma surgeries   • Cardioversion  12/18/2019   • Middle ear surgery         Family Hx:  Family History   Problem Relation Age of Onset   • Coronary Artery Disease Mother 74        CABG   • Congestive Heart Failure Father    • Aneurysm Neg Hx        Social Hx:  Social History     Tobacco Use   • Smoking status: Former Smoker     Packs/day: 0.00   • Smokeless tobacco: Never Used   • Tobacco comment: quit 40 years ago   Substance Use Topics   • Alcohol use: Not Currently   • Drug use: Never        Allergies:  ALLERGIES:  No Known Allergies    Medications:  Current Outpatient Medications   Medication Sig Dispense Refill   • AMIODarone (PACERONE) 200 MG tablet Take 1 tablet by mouth daily. 90 tablet 3   • lisinopril (ZESTRIL) 5 MG tablet TAKE 1 TABLET DAILY 90 tablet 3   • metoPROLOL tartrate (LOPRESSOR) 50 MG tablet Take 50 mg by mouth daily.     • famotidine (PEPCID) 20 MG tablet Take 20 mg by mouth as needed.     • Multiple Vitamin (VITAMIN - THERAPEUTIC MULTIVITAMIN) capsule Take 1 capsule by mouth daily.     • linaclotide (LINZESS) 145 MCG capsule Take 1 tablet by mouth daily.     • latanoprost (XALATAN) 0.005 % ophthalmic solution      • furosemide (LASIX) 20 MG tablet Take 20 mg by mouth daily.     • doxazosin (CARDURA) 4 MG tablet Take 4 mg by mouth daily.     • dorzolamide-timolol (COSOPT) 22.3-6.8 MG/ML ophthalmic solution Inject 1 drop into the eye 2 times daily.     • brimonidine (ALPHAGAN P) 0.15 % ophthalmic solution Apply 1 drop to eye 2 times daily.     • apixaBAN (ELIQUIS) 5 MG Tab Take 5 mg by mouth 2 times daily.       No current facility-administered medications for this visit.        Review of Systems:Negative except for the above stated.   ROS    Constitution: Negative for fever, chills or changes in weight  HEENT: Negative for any new hearing loss.    Eyes: Patient denies significant visual changes   Cardiovascular: see HPI  Respiratory: No cough,  wheezing or hemoptysis   Hematologic/Lymphatic: No easy bruising or bleeding.   Skin: No rash or suspicious lesions.   Musculoskeletal: No arthritis or myalgias   Gastrointestinal: Negative for any bleeding  Genitourinary: No hematuria.   Neurological: Alert and oriented  Allergic/Immunologic: no rhinitis or environmental allergies    Physical Exam:  Visit Vitals  /78   Pulse (!) 57   Wt 103 kg (227 lb)   BMI 32.57 kg/m²       General : alert and oriented, in no apparent distress  HEENT: mucosa is moist, no JVD, no carotid bruit  Cardiac: RRR, nl S1 and S2, no murmur  Respiratory: clear to auscultation, no wheezing  Abdomen : soft, non tender  Extremities: intact pulses, no edema  Neuro: alert and oriented  Musculoskeletal : normal gait, normal tone  Skin: warm, dry  Psychiatric: normal affect        1. Encounter for long-term (current) drug use    2. Dilated cardiomyopathy (CMS/HCC)    3. Dilatation of thoracic aorta (CMS/HCC)    4. Combined systolic and diastolic cardiac dysfunction    5. Atrial fibrillation, currently in sinus rhythm    6. Shortness of breath    7. Hypercholesterolemia    8. Bilateral carotid artery stenosis        Assessment / Plan:  Mr. Cerda is a pleasant 86-year-old gentleman with history of atrial fibrillation.  He has had a history of cardioversion he currently is on amiodarone.  Ejection fraction has improved to 45 to 50%.  Stress testing in 2019 was negative for ischemia.  He has a stable minimally dilated thoracic aorta and will have a follow-up echocardiogram in 1 year.  He has not wanted to be on a statin and we will respect his wishes.  He is on amiodarone and follows with Dr. Mcintosh from electrophysiology.  We will check follow-up blood work.  We will continue to recommend a low-fat low-cholesterol diet as well as a regular exercise program as he is able to tolerate and will follow this very nice gentleman in office.      Belkis Viramontes MD              MEDICATIONS  (STANDING):  amLODIPine   Tablet 5 milliGRAM(s) Oral daily  cefTRIAXone   IVPB 1000 milliGRAM(s) IV Intermittent every 24 hours  chlorhexidine 4% Liquid 1 Application(s) Topical <User Schedule>  levETIRAcetam  IVPB 500 milliGRAM(s) IV Intermittent every 12 hours  lisinopril 5 milliGRAM(s) Oral daily

## 2021-08-30 NOTE — PROGRESS NOTE ADULT - ASSESSMENT
87M with hx of HTN, presents with SDH       -pt resting, without complaints  -mild elevation in CE noted, not consistent with ACS  -Agree with holding aspirin given recent  -B/P managements as per neuro   -cont care per micu

## 2021-08-30 NOTE — PROGRESS NOTE ADULT - PROBLEM SELECTOR PLAN 4
- pt has no chest pain, low suspicion for ACS. EKG nonischemic  - troponin downtrending, last measured 8/28 @ 89  - EKG: Sinus with PAC QTC: 494 TWI in AVL, AVR  - cardiology consulted, recs appreciated - pt has no chest pain, low suspicion for ACS. EKG nonischemic  - troponin downtrending, last measured 8/28 @ 89  - EKG: Sinus with PAC QTC: 499 TWI in AVL, AVR  - cardiology consulted, recs appreciated

## 2021-08-30 NOTE — DIETITIAN INITIAL EVALUATION ADULT. - HEIGHT FOR BMI (INCHES)
Detail Level: Generalized Instructions: This plan will send the code FBSD to the PM system.  DO NOT or CHANGE the price. Price (Do Not Change): 0.00 1

## 2021-08-30 NOTE — PROGRESS NOTE ADULT - SUBJECTIVE AND OBJECTIVE BOX
JOEL BARBER  87y  Male      Patient is a 87y old  Male who presents with a chief complaint of subdural hematoma (30 Aug 2021 13:17)      INTERVAL HPI/OVERNIGHT EVENTS:      REVIEW OF SYSTEMS:  CONSTITUTIONAL: No fever, weight loss, or fatigue  EYES: No eye pain, visual disturbances, or discharge  ENMT:  No difficulty hearing, tinnitus, vertigo; No sinus or throat pain  NECK: No pain or stiffness  BREASTS: No pain, masses, or nipple discharge  RESPIRATORY: No cough, wheezing, chills or hemoptysis; No shortness of breath  CARDIOVASCULAR: No chest pain, palpitations, dizziness, or leg swelling  GASTROINTESTINAL: No abdominal or epigastric pain. No nausea, vomiting, or hematemesis; No diarrhea or constipation. No melena or hematochezia.  GENITOURINARY: No dysuria, frequency, hematuria, or incontinence  NEUROLOGICAL: No headaches, memory loss, loss of strength, numbness, or tremors  SKIN: No itching, burning, rashes, or lesions   LYMPH NODES: No enlarged glands  ENDOCRINE: No heat or cold intolerance; No hair loss  MUSCULOSKELETAL: No joint pain or swelling; No muscle, back, or extremity pain  PSYCHIATRIC: No depression, anxiety, mood swings, or difficulty sleeping  HEME/LYMPH: No easy bruising, or bleeding gums  ALLERY AND IMMUNOLOGIC: No hives or eczema  FAMILY HISTORY:  No pertinent family history in first degree relatives      T(C): 36.3 (08-30-21 @ 17:40), Max: 36.3 (08-30-21 @ 17:40)  HR: 83 (08-30-21 @ 17:40) (68 - 99)  BP: 143/72 (08-30-21 @ 17:40) (114/63 - 155/64)  RR: 18 (08-30-21 @ 17:40) (12 - 28)  SpO2: 100% (08-30-21 @ 17:40) (97% - 100%)  Wt(kg): --Vital Signs Last 24 Hrs  T(C): 36.3 (30 Aug 2021 17:40), Max: 36.3 (30 Aug 2021 17:40)  T(F): 97.3 (30 Aug 2021 17:40), Max: 97.3 (30 Aug 2021 17:40)  HR: 83 (30 Aug 2021 17:40) (68 - 99)  BP: 143/72 (30 Aug 2021 17:40) (114/63 - 155/64)  BP(mean): 72 (30 Aug 2021 16:00) (66 - 104)  RR: 18 (30 Aug 2021 17:40) (12 - 28)  SpO2: 100% (30 Aug 2021 17:40) (97% - 100%)  No Known Allergies      PHYSICAL EXAM:  GENERAL: NAD, well-groomed, well-developed  HEAD:  Atraumatic, Normocephalic  EYES: EOMI, PERRLA, conjunctiva and sclera clear  ENMT: No tonsillar erythema, exudates, or enlargement; Moist mucous membranes, Good dentition, No lesions  NECK: Supple, No JVD, Normal thyroid  NERVOUS SYSTEM:  Alert & Oriented X3, Good concentration; Motor Strength 5/5 B/L upper and lower extremities; DTRs 2+ intact and symmetric  CHEST/LUNG: Clear to percussion bilaterally; No rales, rhonchi, wheezing, or rubs  HEART: Regular rate and rhythm; No murmurs, rubs, or gallops  ABDOMEN: Soft, Nontender, Nondistended; Bowel sounds present  EXTREMITIES:  2+ Peripheral Pulses, No clubbing, cyanosis, or edema  LYMPH: No lymphadenopathy noted  SKIN: No rashes or lesions    Consultant(s) Notes Reviewed:  [x ] YES  [ ] NO  Care Discussed with Consultants/Other Providers [ x] YES  [ ] NO    LABS:      RADIOLOGY & ADDITIONAL TESTS:    Imaging Personally Reviewed:  [ ] YES  [ ] NO  amLODIPine   Tablet 5 milliGRAM(s) Oral daily  cefTRIAXone   IVPB 1000 milliGRAM(s) IV Intermittent every 24 hours  labetalol Injectable 5 milliGRAM(s) IV Push every 6 hours PRN  levETIRAcetam  IVPB 500 milliGRAM(s) IV Intermittent every 12 hours  lisinopril 5 milliGRAM(s) Oral daily      HEALTH ISSUES - PROBLEM Dx:  Subdural hematoma    Urinary tract infection    Hypertension    Need for prophylactic measure    Discharge planning issues    Chronic kidney disease (CKD)    QT prolongation    Elevated troponin    Syncope             JOEL BARBER  87y  Male      Patient is a 87y old  Male who presents with a chief complaint of subdural hematoma (30 Aug 2021 13:17)      INTERVAL HPI/OVERNIGHT EVENTS: Pt seen bedside. No reported events since coming to medicine floor. Pt in good spirits. Denies abdominal pain, headache. Endorsed some confusion regarding his car before admission. Pt to be transferred to [facility] tomorrow.      REVIEW OF SYSTEMS:  CONSTITUTIONAL: No fever, weight loss, or fatigue  EYES: No eye pain, visual disturbances, or discharge  ENMT:  No difficulty hearing, tinnitus, vertigo; No sinus or throat pain  NECK: No pain or stiffness  BREASTS: No pain, masses, or nipple discharge  RESPIRATORY: No cough, wheezing, chills or hemoptysis; No shortness of breath  CARDIOVASCULAR: No chest pain, palpitations, dizziness, or leg swelling  GASTROINTESTINAL: No abdominal or epigastric pain. No nausea, vomiting, or hematemesis; No diarrhea or constipation. No melena or hematochezia.  GENITOURINARY: No dysuria, frequency, hematuria, or incontinence  NEUROLOGICAL: No headaches, memory loss, loss of strength, numbness, or tremors  SKIN: No itching, burning, rashes, or lesions   LYMPH NODES: No enlarged glands  ENDOCRINE: No heat or cold intolerance; No hair loss  MUSCULOSKELETAL: No joint pain or swelling; No muscle, back, or extremity pain  PSYCHIATRIC: No depression, anxiety, mood swings, or difficulty sleeping  HEME/LYMPH: No easy bruising, or bleeding gums  ALLERY AND IMMUNOLOGIC: No hives or eczema    FAMILY HISTORY:  No pertinent family history in first degree relatives    T(C): 36.3 (08-30-21 @ 17:40), Max: 36.3 (08-30-21 @ 17:40)  HR: 83 (08-30-21 @ 17:40) (68 - 99)  BP: 143/72 (08-30-21 @ 17:40) (114/63 - 155/64)  RR: 18 (08-30-21 @ 17:40) (12 - 28)  SpO2: 100% (08-30-21 @ 17:40) (97% - 100%)    PHYSICAL EXAM:  GENERAL: NAD, well-groomed, well-developed. AAOx2 (name, month, and location, did not know year)  HEAD:  Atraumatic, Normocephalic  EYES: EOMI, PERRLA, conjunctiva and sclera clear  ENMT: No tonsillar erythema, exudates, or enlargement; Moist mucous membranes, Good dentition, No lesions  NECK: Supple, No JVD, Normal thyroid  NERVOUS SYSTEM:  Alert & Oriented X3, Good concentration; Motor Strength 5/5 B/L upper and lower extremities; DTRs 2+ intact and symmetric  CHEST/LUNG: Clear to percussion bilaterally; No rales, rhonchi, wheezing, or rubs  HEART: Regular rate and rhythm; No murmurs, rubs, or gallops  ABDOMEN: Soft, Nontender, Nondistended; Bowel sounds present  EXTREMITIES:  2+ Peripheral Pulses, No clubbing, cyanosis, or edema  LYMPH: No lymphadenopathy noted  SKIN: No rashes or lesions    Consultant(s) Notes Reviewed:  [x ] YES  [ ] NO  Care Discussed with Consultants/Other Providers [ x] YES  [ ] NO    LABS:                        12.2   5.84  )-----------( 194      ( 30 Aug 2021 01:42 )             37.2   08-30    139  |  106  |  19  ----------------------------<  144<H>  4.1   |  24  |  0.97    Ca    8.7      30 Aug 2021 01:42  Phos  3.0     08-30  Mg     2.20     08-30    TPro  6.2  /  Alb  3.2<L>  /  TBili  0.2  /  DBili  x   /  AST  14  /  ALT  11  /  AlkPhos  56  08-30      RADIOLOGY & ADDITIONAL TESTS:  CT Head No Cont (08.27.21 @ 22:05)  IMPRESSION:    There is an age indeterminant left frontoparietal subdural hematoma. There is local mass effect with effacement of the sulci in the left frontal, parietal, and temporal lobes. There is subfalcine herniation with a 7 mm midline shift. There is no parenchymal hemorrhage    CT Head No Cont (08.28.21 @ 10:14)  IMPRESSION:  Unchanged exam compared to prior from 8/27/2021.    Transthoracic Echocardiogram (08.29.21 @ 09:56)  CONCLUSIONS:  1. Mild concentric left ventricular hypertrophy.  2. Endocardium not well visualized; grossly normal left  ventricular systolic function.The  Septum and apex are  thickened consider Hypertrophic cardiomyopathy (clip 22-27)  3. Normal right ventricular size and function.  *** No previous Echo exam.          Imaging Personally Reviewed:  [ ] YES  [X ] NO    amLODIPine   Tablet 5 milliGRAM(s) Oral daily  cefTRIAXone   IVPB 1000 milliGRAM(s) IV Intermittent every 24 hours  labetalol Injectable 5 milliGRAM(s) IV Push every 6 hours PRN  levETIRAcetam  IVPB 500 milliGRAM(s) IV Intermittent every 12 hours  lisinopril 5 milliGRAM(s) Oral daily      HEALTH ISSUES - PROBLEM Dx:  Subdural hematoma    Urinary tract infection    Hypertension    Need for prophylactic measure    Discharge planning issues    Chronic kidney disease (CKD)    QT prolongation    Elevated troponin    Syncope

## 2021-08-30 NOTE — DIETITIAN INITIAL EVALUATION ADULT. - PROBLEM SELECTOR PLAN 3
Patient is chest pain free, low suspicion ACS. May be in the setting of mild CKD, repeat troponin. EKG nonischemic, monitor on tele.   EKG: Sinus with PAC QTC: 494 TWI in AVL, AVR

## 2021-08-30 NOTE — PROGRESS NOTE ADULT - PROBLEM SELECTOR PLAN 8
Dispo: d/w neurosurgery if plan is to send pt to [facility] for embolization Dispo: transfer pt to [facility] for embolization

## 2021-08-30 NOTE — DIETITIAN INITIAL EVALUATION ADULT. - OTHER INFO
86yo M with PMH HTN, HLD presenting with confusion and found to have SDH.    Spoke with RN and met with Pt. who is pleasantly disoriented and limited historian.  Is aware he is in the hospital, however cannot specify which one.      Pt. reports/reported with 100% consumption of meals.  However, would add PO supplement Glucerna Shake considering Pt.'s generally emaciated appearance and concern for weight loss.  States usual body weight as "230-240lbs" with possibly weight loss, but cannot specify how long ago he last weighed this or amount of weight change.  Per historical chart documentation Pt. noted as 218lbs (98.9kg) Sept 2018.        Pt. denies food allergies, nausea/vomiting/diarrhea/constipation, or issues with swallowing with current diet consistency.    RDN informed Pt. of therapeutic diet modifications.

## 2021-08-30 NOTE — PROGRESS NOTE ADULT - PROBLEM SELECTOR PLAN 2
-Episode 3 months ago per wife, unclear workup  -echo ordered  -keppra 500 BID for seizure ppx -Episode 3 months ago per wife, unclear workup  -echo done, pt currently stable  -keppra 500 BID for seizure ppx

## 2021-08-30 NOTE — DIETITIAN NUTRITION RISK NOTIFICATION - TREATMENT: THE FOLLOWING DIET HAS BEEN RECOMMENDED
Diet, Dysphagia 2 Mechanical Soft-Thin Liquids:   DASH/TLC {Sodium & Cholesterol Restricted} (DASH) (08-29-21 @ 16:44) [Active]

## 2021-08-30 NOTE — DISCHARGE NOTE PROVIDER - NSDCDCMDCOMP_GEN_ALL_CORE
HISTORY OF PRESENT ILLNESS  Ian Arnold is a 77 y.o. female. HPI Comments: This patient is a 68-year-old  female complaining of vertigo. Several weeks ago she awakened one morning to find that she was unsteady on her feet and had a resolved will rotational feeling of movement. She denies nausea or vomiting with it. Eyes any other complaints of headache, numbness, or tingling. She is not on any daily medications. She has a past medical history of collagenous colitis, bilateral knee replacements, moderate obesity, carpal tunnel syndrome, lateral, and bilateral cataract removal.  She denies any new visual complaints other than vertigo. She did have a CTA of her head which revealed very mild narrowing in the right PCA    Dizziness    The history is provided by the patient. This is a chronic problem. Associated symptoms include dizziness. Review of Systems   Constitutional:        Review of systems is positive for occasional diarrhea, some joint pain, some shortness of breath, snoring, the above-mentioned vertigo. Complete review of systems done all others negative   Neurological: Positive for dizziness. Current Outpatient Prescriptions on File Prior to Visit   Medication Sig Dispense Refill    ondansetron hcl (ZOFRAN) 4 mg tablet Take 1 Tab by mouth every eight (8) hours as needed for Nausea. 20 Tab 0     No current facility-administered medications on file prior to visit. Past Medical History:   Diagnosis Date    Arthritis     Gastrointestinal disorder     colitis, collagenous    Hiatal hernia     Neurological disorder 2011    vertigo      No family history on file.   Social History   Substance Use Topics    Smoking status: Never Smoker    Smokeless tobacco: Never Used    Alcohol use Yes     /80  Pulse 78  Ht 5' 7\" (1.702 m)  Wt 206 lb (93.4 kg)  SpO2 98%  BMI 32.26 kg/m2    Physical Exam   She has very mild nystagmus looking to the right in both eyes.  Constitutional: Oriented to person, place, and time, appears well-developed and well-nourished. No distress. HENT:   Head: Normocephalic and atraumatic. Mouth/Throat: Oropharynx is clear and moist. No oropharyngeal exudate. Eyes: Conjunctivae and EOM are normal. Pupils are equal, round, and reactive to light. No scleral icterus. Neck: Normal range of motion. Neck supple. No thyromegaly present. Cardiovascular: Normal rate, regular rhythm and normal heart sounds. Musculoskeletal: Normal range of motion, exhibits no edema, tenderness or deformity. Lymphadenopathy: no cervical adenopathy. Neurological: Alert and oriented to person, place, and time. Normal strength and normal reflexes. Displays no atrophy and no tremor. No cranial nerve deficit or sensory deficit. Exhibits normal muscle tone. Displays a negative Romberg sign, no seizure activity. Coordination normal, gait normal.   No Babinski's sign on the right side. No Babinski's sign on the left side. Speech, language and mentation are normal  Visual fields are full to confrontation, funduscopic exam reveals flat discs, the retina and vasculature are normal   Skin: Skin is warm and dry. No rash noted, not diaphoretic. No erythema. Psychiatric: Normal mood and affect,  behavior is normal. Judgment and thought content normal.   Vitals reviewed. ASSESSMENT and PLAN  VESTIBULAR VERTIGO  This patient appears to have peripheral vestibular vertigo. Her CT and CTA just show a very minimal abnormality in the left PCA which is normal for age. I see no reason for further imaging at this time. I am going to put her into therapy over at [facility] where they will attempt positioning maneuvers to reposition canaliths and vestibular therapy to reduce symptoms. I will plan to see her back in 6 weeks. I do not think further neuroimaging is needed at this time  This note will not be viewable in MyChart. This document is complete and the patient is ready for discharge.

## 2021-08-30 NOTE — DIETITIAN INITIAL EVALUATION ADULT. - PROBLEM SELECTOR PLAN 2
Patient presents to ED with RLQ abdominal pain.  Patient has had a couple hernia repair surgeries in the past 6 months.  Patient started with the abdominal pain about 4 days ago.    
Dysuria, positive UA  Follow UC  s/p ceftriaxone --> cont ceftriaxone

## 2021-08-30 NOTE — PROGRESS NOTE ADULT - PROBLEM SELECTOR PLAN 6
- positive UA  - pending UCx  - continue ceftriaxone. - positive UA  - UCx showed some organisms, likely contamination  - continue ceftriaxone.

## 2021-08-30 NOTE — DISCHARGE NOTE PROVIDER - DETAILS OF MALNUTRITION DIAGNOSIS/DIAGNOSES
This patient has been assessed with a concern for Malnutrition and was treated during this hospitalization for the following Nutrition diagnosis/diagnoses:     -  08/30/2021: Severe protein-calorie malnutrition

## 2021-08-30 NOTE — DIETITIAN INITIAL EVALUATION ADULT. - PERTINENT LABORATORY DATA
08-30    139  |  106  |  19  ----------------------------<  144<H>  4.1   |  24  |  0.97    Ca    8.7      30 Aug 2021 01:42  Phos  3.0     08-30  Mg     2.20     08-30    TPro  6.2  /  Alb  3.2<L>  /  TBili  0.2  /  DBili  x   /  AST  14  /  ALT  11  /  AlkPhos  56  08-30      HbA1c 4.7%

## 2021-08-30 NOTE — PROGRESS NOTE ADULT - SUBJECTIVE AND OBJECTIVE BOX
INTERVAL HPI/OVERNIGHT EVENTS:    SUBJECTIVE:   This is 87y Male Hospital Day     OBJECTIVE:    VITAL SIGNS:  ICU Vital Signs Last 24 Hrs  T(C): 36.1 (30 Aug 2021 04:00), Max: 36.4 (29 Aug 2021 12:00)  T(F): 96.9 (30 Aug 2021 04:00), Max: 97.6 (29 Aug 2021 12:00)  HR: 70 (30 Aug 2021 08:00) (64 - 83)  BP: 123/66 (30 Aug 2021 08:00) (114/63 - 153/64)  BP(mean): 79 (30 Aug 2021 08:00) (69 - 98)  ABP: --  ABP(mean): --  RR: 15 (30 Aug 2021 08:00) (11 - 28)  SpO2: 99% (30 Aug 2021 08:00) (93% - 100%)        08-29 @ 07:01  -  08-30 @ 07:00  --------------------------------------------------------  IN: 340 mL / OUT: 1200 mL / NET: -860 mL          PHYSICAL EXAM:  GENERAL: NAD, well-groomed, well-developed  HEAD:  Atraumatic, Normocephalic  EYES: EOMI, PERRLA, conjunctiva and sclera clear  ENMT: No tonsillar erythema, exudates, or enlargement; Moist mucous membranes, Good dentition, No lesions  NECK: Supple, No JVD, Normal thyroid  CHEST/LUNG: Clear to auscultation bilaterally; No rales, rhonchi, wheezing, or rubs  HEART: Regular rate and rhythm; No murmurs, rubs, or gallops  ABDOMEN: Soft, Nontender, Nondistended; Bowel sounds present  EXTREMITIES:  2+ Peripheral Pulses, No clubbing, cyanosis, or edema  LYMPH: No lymphadenopathy noted  SKIN: No rashes or lesions  NERVOUS SYSTEM:  Alert & Oriented X4, Good concentration  PSYCH: Normal Affect. Speaking in Full Sentences. Laying in bed comfortably; not agitated   LINES:                                       MEDICATIONS:  MEDICATIONS  (STANDING):  amLODIPine   Tablet 5 milliGRAM(s) Oral daily  cefTRIAXone   IVPB 1000 milliGRAM(s) IV Intermittent every 24 hours  chlorhexidine 4% Liquid 1 Application(s) Topical <User Schedule>  levETIRAcetam  IVPB 500 milliGRAM(s) IV Intermittent every 12 hours  lisinopril 5 milliGRAM(s) Oral daily    MEDICATIONS  (PRN):  labetalol Injectable 5 milliGRAM(s) IV Push every 6 hours PRN Systolic blood pressure >160      ALLERGIES:  Allergies    No Known Allergies    Intolerances        LABS:                        12.2   5.84  )-----------( 194      ( 30 Aug 2021 01:42 )             37.2     08-30    139  |  106  |  19  ----------------------------<  144<H>  4.1   |  24  |  0.97    Ca    8.7      30 Aug 2021 01:42  Phos  3.0     08-30  Mg     2.20     08-30    TPro  6.2  /  Alb  3.2<L>  /  TBili  0.2  /  DBili  x   /  AST  14  /  ALT  11  /  AlkPhos  56  08-30    PT/INR - ( 30 Aug 2021 01:42 )   PT: 12.8 sec;   INR: 1.13 ratio         PTT - ( 30 Aug 2021 01:42 )  PTT:23.4 sec      CAPILLARY BLOOD GLUCOSE          RADIOLOGY & ADDITIONAL TESTS: Reviewed. INTERVAL HPI/OVERNIGHT EVENTS:    SUBJECTIVE:   Spoke with Patient at Bedside. No acute events overnight. No active complaints. Patient denies Ha/F/N/V/CP/SOB/Abd Pain/D/Dysuria/Swelling  OBJECTIVE:    VITAL SIGNS:  ICU Vital Signs Last 24 Hrs  T(C): 36.1 (30 Aug 2021 04:00), Max: 36.4 (29 Aug 2021 12:00)  T(F): 96.9 (30 Aug 2021 04:00), Max: 97.6 (29 Aug 2021 12:00)  HR: 70 (30 Aug 2021 08:00) (64 - 83)  BP: 123/66 (30 Aug 2021 08:00) (114/63 - 153/64)  BP(mean): 79 (30 Aug 2021 08:00) (69 - 98)  ABP: --  ABP(mean): --  RR: 15 (30 Aug 2021 08:00) (11 - 28)  SpO2: 99% (30 Aug 2021 08:00) (93% - 100%)        08-29 @ 07:01  -  08-30 @ 07:00  --------------------------------------------------------  IN: 340 mL / OUT: 1200 mL / NET: -860 mL          PHYSICAL EXAM:  GENERAL: NAD, well-developed  HEAD:  Atraumatic, Normocephalic  EYES: EOMI, PERRLA, conjunctiva and sclera clear  NECK: Supple, No JVD  CHEST/LUNG: Clear to auscultation bilaterally; No wheeze  HEART: Regular rate and rhythm; No murmurs, rubs, or gallops  ABDOMEN: Soft, Nontender, Nondistended; Bowel sounds present  EXTREMITIES:  2+ Peripheral Pulses, No clubbing, cyanosis, or edema  PSYCH: AAOx2  NEUROLOGY: non-focal, LLE slightly weaker than RLE   SKIN: No rashes or lesions  LINES:  Left PIV                                                   MEDICATIONS:  MEDICATIONS  (STANDING):  amLODIPine   Tablet 5 milliGRAM(s) Oral daily  cefTRIAXone   IVPB 1000 milliGRAM(s) IV Intermittent every 24 hours  chlorhexidine 4% Liquid 1 Application(s) Topical <User Schedule>  levETIRAcetam  IVPB 500 milliGRAM(s) IV Intermittent every 12 hours  lisinopril 5 milliGRAM(s) Oral daily    MEDICATIONS  (PRN):  labetalol Injectable 5 milliGRAM(s) IV Push every 6 hours PRN Systolic blood pressure >160      ALLERGIES:  Allergies    No Known Allergies    Intolerances        LABS:                        12.2   5.84  )-----------( 194      ( 30 Aug 2021 01:42 )             37.2     08-30    139  |  106  |  19  ----------------------------<  144<H>  4.1   |  24  |  0.97    Ca    8.7      30 Aug 2021 01:42  Phos  3.0     08-30  Mg     2.20     08-30    TPro  6.2  /  Alb  3.2<L>  /  TBili  0.2  /  DBili  x   /  AST  14  /  ALT  11  /  AlkPhos  56  08-30    PT/INR - ( 30 Aug 2021 01:42 )   PT: 12.8 sec;   INR: 1.13 ratio         PTT - ( 30 Aug 2021 01:42 )  PTT:23.4 sec      CAPILLARY BLOOD GLUCOSE          RADIOLOGY & ADDITIONAL TESTS: Reviewed.

## 2021-08-30 NOTE — PROGRESS NOTE ADULT - ASSESSMENT
86 y/o M with hx of HTN, presents with subdural hematoma with subfalcine herniation and 0.7 cm rightward midline shift seen on outpatient CT head.

## 2021-08-30 NOTE — EEG REPORT - NS EEG TEXT BOX
REPORT OF CONTINUOUS VIDEO EEG   [facility]: [address], [place] [address], Ph#: [phone] [facility]: [address] [place] [address], [place] [address], Ph#: [phone] Office: [address] [place] [address], [place] [address] Ph#: [phone]  Patient Name: JOEL BARBER Age and : 87y ([birth year]) MRN #: [id number] Location: [place] [address] Referring Physician: Asim Acharya  Start Time/Date: 10:30 on 21 End Time/Date: 08:00 on 21 Duration: 21 hours 12 minutes  _____________________________________________________________ STUDY INFORMATION  EEG Recording Technique: The patient underwent continuous Video-EEG monitoring, using Telemetry System hardware on the XLTek Digital System. EEG and video data were stored on a computer hard drive with important events saved in digital archive files. The material was reviewed by a physician (electroencephalographer / epileptologist) on a daily basis. Federico and seizure detection algorithms were utilized and reviewed. An EEG Technician attended to the patient, and was available throughout daytime work hours.  The epilepsy center neurologist was available in person or on call 24-hours per day.  EEG Placement and Labeling of Electrodes: The EEG was performed utilizing 20 channel referential EEG connections (coronal over temporal over parasagittal montage) using all standard 10-20 electrode placements with EKG, with additional electrodes placed in the inferior temporal region using the modified 10-10 montage electrode placements for elective admissions, or if deemed necessary. Recording was at a sampling rate of 256 samples per second per channel. Time synchronized digital video recording was done simultaneously with EEG recording. A low light infrared camera was used for low light recording.   _____________________________________________________________ HISTORY  Patient is a 87y old  Male who presents with a chief complaint of subdural hematoma (29 Aug 2021 10:37)   PERTINENT MEDICATION: levETIRAcetam  IVPB 500 milliGRAM(s) IV Intermittent every 12 hours _____________________________________________________________ STUDY INTERPRETATION  Findings: The background was continuous and reactive. During wakefulness, the posterior dominant rhythm consisted of symmetric, well-modulated 9 Hz activity, with amplitude to 30 uV, that attenuated to eye opening, better seen on the right.  Background Slowing: Background predominantly consisted of theta, delta and faster activities.  Focal Slowing:  None were present.  Sleep Background: Drowsiness was characterized by fragmentation, attenuation, and slowing of the background activity.   Sleep was characterized by the presence of vertex waves, symmetric sleep spindles and K-complexes.  Other Non-Epileptiform Findings: None were present.  Interictal Epileptiform Activity:  None were present.  Events: Clinical events: None recorded. Seizures: None recorded.  Activation Procedures:  Hyperventilation was not performed.   Photic stimulation was not performed.   Artifacts: Intermittent myogenic and movement artifacts were noted.  ECG: The heart rate on single channel ECG was predominantly between 60-80 BPM.  _____________________________________________________________ EEG SUMMARY/CLASSIFICATION  Abnormal EEG in the awake, drowsy and asleep states.  - Mild generalized background slowing.  _____________________________________________________________ EEG IMPRESSION/CLINICAL CORRELATE  Abnormal EEG study.  - Mild nonspecific diffuse or multifocal cerebral dysfunction.  - No epileptiform pattern or seizure seen.  In absence of additional clinical concerns, recommend consideration for discontinuation of current EEG study with reconnection in future if clinically warranted.  *** PRELIMINARY REPORT - PENDING EPILEPSY ATTENDING REVIEW *** _____________________________________________________________  Tyrone Reid MD, TARYN Fellow, [facility] Epilepsy [place]     REPORT OF CONTINUOUS VIDEO EEG   [facility]: [address], [place] [address], Ph#: [phone] [facility]: [address] [place] [address], [place] [address], Ph#: [phone] Office: [address] [place] [address], [place] [address] Ph#: [phone]  Patient Name: JOEL BARBER Age and : 87y ([birth year]) MRN #: [id number] Location: [place] [address] Referring Physician: Asim Acharya  Start Time/Date: 10:30 on 21 End Time/Date: 09:30 on 21 Duration: 22 hours 45 minutes  _____________________________________________________________ STUDY INFORMATION  EEG Recording Technique: The patient underwent continuous Video-EEG monitoring, using Telemetry System hardware on the XLTek Digital System. EEG and video data were stored on a computer hard drive with important events saved in digital archive files. The material was reviewed by a physician (electroencephalographer / epileptologist) on a daily basis. Federico and seizure detection algorithms were utilized and reviewed. An EEG Technician attended to the patient, and was available throughout daytime work hours.  The epilepsy center neurologist was available in person or on call 24-hours per day.  EEG Placement and Labeling of Electrodes: The EEG was performed utilizing 20 channel referential EEG connections (coronal over temporal over parasagittal montage) using all standard 10-20 electrode placements with EKG, with additional electrodes placed in the inferior temporal region using the modified 10-10 montage electrode placements for elective admissions, or if deemed necessary. Recording was at a sampling rate of 256 samples per second per channel. Time synchronized digital video recording was done simultaneously with EEG recording. A low light infrared camera was used for low light recording.   _____________________________________________________________ HISTORY  Patient is a 87y old  Male who presents with a chief complaint of subdural hematoma (29 Aug 2021 10:37)   PERTINENT MEDICATION: levETIRAcetam  IVPB 500 milliGRAM(s) IV Intermittent every 12 hours _____________________________________________________________ STUDY INTERPRETATION  Findings: The background was continuous and reactive. During wakefulness, the posterior dominant rhythm consisted of symmetric, well-modulated 9 Hz activity, with amplitude to 30 uV, that attenuated to eye opening, better seen on the right.  Background Slowing: Background predominantly consisted of theta, delta and faster activities.  Focal Slowing:  None were present.  Sleep Background: Drowsiness was characterized by fragmentation, attenuation, and slowing of the background activity.   Sleep was characterized by the presence of vertex waves, symmetric sleep spindles and K-complexes.  Other Non-Epileptiform Findings: None were present.  Interictal Epileptiform Activity:  None were present.  Events: Clinical events: None recorded. Seizures: None recorded.  Activation Procedures:  Hyperventilation was not performed.   Photic stimulation was not performed.   Artifacts: Intermittent myogenic and movement artifacts were noted.  ECG: The heart rate on single channel ECG was predominantly between 60-80 BPM.  _____________________________________________________________ EEG SUMMARY/CLASSIFICATION  Abnormal EEG in the awake, drowsy and asleep states.  - Mild generalized background slowing.  _____________________________________________________________ EEG IMPRESSION/CLINICAL CORRELATE  Abnormal EEG study.  - Mild nonspecific diffuse or multifocal cerebral dysfunction.  - No epileptiform pattern or seizure seen.  In absence of additional clinical concerns, recommend consideration for discontinuation of current EEG study with reconnection in future if clinically warranted.  *** PRELIMINARY REPORT - PENDING EPILEPSY ATTENDING REVIEW *** _____________________________________________________________  Tyrone Reid MD, TARYN Fellow, [facility] Epilepsy [place]     REPORT OF CONTINUOUS VIDEO EEG   [facility]: [address], [place] [address], Ph#: [phone] [facility]: [address] [place] [address], [place] [address], Ph#: [phone] Office: [address] [place] [address], [place] [address] Ph#: [phone]  Patient Name: JOEL BARBER Age and : 87y ([birth year]) MRN #: [id number] Location: [place] [address] Referring Physician: Asim Acharya  Start Time/Date: 10:30 on 21 End Time/Date: 09:30 on 21 Duration: 22 hours 45 minutes  _____________________________________________________________ STUDY INFORMATION  EEG Recording Technique: The patient underwent continuous Video-EEG monitoring, using Telemetry System hardware on the XLTek Digital System. EEG and video data were stored on a computer hard drive with important events saved in digital archive files. The material was reviewed by a physician (electroencephalographer / epileptologist) on a daily basis. Federico and seizure detection algorithms were utilized and reviewed. An EEG Technician attended to the patient, and was available throughout daytime work hours.  The epilepsy center neurologist was available in person or on call 24-hours per day.  EEG Placement and Labeling of Electrodes: The EEG was performed utilizing 20 channel referential EEG connections (coronal over temporal over parasagittal montage) using all standard 10-20 electrode placements with EKG, with additional electrodes placed in the inferior temporal region using the modified 10-10 montage electrode placements for elective admissions, or if deemed necessary. Recording was at a sampling rate of 256 samples per second per channel. Time synchronized digital video recording was done simultaneously with EEG recording. A low light infrared camera was used for low light recording.   _____________________________________________________________ HISTORY  Patient is a 87y old  Male who presents with a chief complaint of subdural hematoma (29 Aug 2021 10:37)   PERTINENT MEDICATION: levETIRAcetam  IVPB 500 milliGRAM(s) IV Intermittent every 12 hours _____________________________________________________________ STUDY INTERPRETATION  Findings: The background was continuous and reactive. During wakefulness, the posterior dominant rhythm consisted of symmetric, well-modulated 9 Hz activity, with amplitude to 30 uV, that attenuated to eye opening, better seen on the right.  Background Slowing: None.  Focal Slowing:  Intermittent polymorphic theta to delta activity in the left frontotemporal region.  Sleep Background: Drowsiness was characterized by fragmentation, attenuation, and slowing of the background activity.   Sleep was characterized by the presence of vertex waves, symmetric sleep spindles and K-complexes.  Other Non-Epileptiform Findings: None were present.  Interictal Epileptiform Activity:  None were present.  Events: Clinical events: None recorded. Seizures: None recorded.  Activation Procedures:  Hyperventilation was not performed.   Photic stimulation was not performed.   Artifacts: Intermittent myogenic and movement artifacts were noted.  ECG: The heart rate on single channel ECG was predominantly between 60-80 BPM.  _____________________________________________________________ EEG SUMMARY/CLASSIFICATION  Abnormal EEG in the awake, drowsy and asleep states.  - Intermitteng left frontotemporal slowing  _____________________________________________________________ EEG IMPRESSION/CLINICAL CORRELATE  Abnormal EEG study.  -Focal left frontotemporal cerebral dysfunction likely due to underlying structural lesion. - No epileptiform pattern or seizure seen.  In absence of additional clinical concerns, recommend consideration for discontinuation of current EEG study with reconnection in future if clinically warranted. _____________________________________________________________  Tyrone Reid MD, TARYN Fellow, [facility] Epilepsy [place]  Pia Teran DO Attending Physician [facility] Epilepsy [place]

## 2021-08-30 NOTE — PROGRESS NOTE ADULT - PROBLEM SELECTOR PLAN 3
- continue lisinopril 10mg qd and amlodipine 5mg qd  - IV labetalol for SBP >160  - Goal SBP <160 to normotension for SDH for now  - cardiology consulted, recs appreciated

## 2021-08-30 NOTE — DISCHARGE NOTE PROVIDER - CARE PROVIDER_API CALL
Chico Johnson)  Neurosurgery  General  [address] [place] [address], [address]  [place] [address]  Phone: [phone]  Fax: [phone]  Follow Up Time: 2 weeks

## 2021-08-30 NOTE — PROGRESS NOTE ADULT - PROBLEM SELECTOR PLAN 7
DVT ppx: no AC given due to SDH  Diet: Normal DVT ppx: no AC given due to SDH  Diet: NPO after midnight tonight, transfer to [facility] tomorrow for embolization

## 2021-08-30 NOTE — DIETITIAN INITIAL EVALUATION ADULT. - PROBLEM SELECTOR PLAN 1
CTH showed:  There is an age indeterminant left frontoparietal subdural hematoma. There is local mass effect with effacement of the sulci in the left frontal, parietal, and temporal lobes. There is subfalcine herniation with a 7 mm midline shift. There is no parenchymal hemorrhage - read 10:23PM 8/27  Per NSx with access to [facility] CTH, imaging was done 8/26 3:15PM  -Will repeat CTH this AM at 10AM for 12 hour interval from one done here  -discussed with Neurosurgery and patient is ok to be monitored on medicine floor with neurochecks q4, SBP goal <160 to eventual normotension, CTH in 12 hours, no acute intervention at this time  - q4 Neuro checks  -Keep SBP <160  -Fall risk  -Hold ASA and other AC  Appears ASA is for primary prevention - would discontinue given bleeding risk

## 2021-08-30 NOTE — DISCHARGE NOTE PROVIDER - NSDCCPCAREPLAN_GEN_ALL_CORE_FT
PRINCIPAL DISCHARGE DIAGNOSIS  Diagnosis: Subdural hematoma  Assessment and Plan of Treatment: You were admitted because you had an outside scan that showed that you had a collection of blood in your brain that is pushing on your brain. You were admitted to the ICU for the increased surveillance      SECONDARY DISCHARGE DIAGNOSES  Diagnosis: Urinary tract infection  Assessment and Plan of Treatment:      PRINCIPAL DISCHARGE DIAGNOSIS  Diagnosis: Subdural hematoma  Assessment and Plan of Treatment: You were admitted because you had an outside scan that showed that you had a collection of blood in your brain that is pushing on your brain. You were admitted to the ICU for the increased surveillance. Repeat CT scans and EEG for seizures were negative showed no change. Neurosurgery wanted to do a trial to see if they could help treat your hematoma/prevent it from reoccuring. Please follow up with your PCP as soon as possible      SECONDARY DISCHARGE DIAGNOSES  Diagnosis: Urinary tract infection  Assessment and Plan of Treatment: You were found to have a urinary tract infection and given antibiotics for it. You finished the treatment but cultures were indeterminate. Please let a health care provider know if you start feeling any burning or increase in frequency in terms of urinating

## 2021-08-30 NOTE — DISCHARGE NOTE PROVIDER - NSDCMRMEDTOKEN_GEN_ALL_CORE_FT
aspirin 81 mg oral tablet: 1 tab(s) orally once a day  last dose 9/5  lisinopril 10 mg oral tablet: 1 tab(s) orally once a day in pm   acetaminophen 500 mg oral tablet: 1 tab(s) orally every 6 hours, As needed, Mild Pain (1 - 3)  amLODIPine 5 mg oral tablet: 1 tab(s) orally once a day  labetalol 5 mg/mL intravenous solution: 1 milliliter(s) intravenous every 6 hours, As needed, Systolic blood pressure &gt;160  levETIRAcetam 100 mg/mL intravenous solution: 5 milliliter(s) intravenous every 12 hours  lisinopril 10 mg oral tablet: 1 tab(s) orally once a day in pm  oxyCODONE 5 mg oral tablet: 1 tab(s) orally every 4 hours, As needed, Moderate Pain (4 - 6)  polyethylene glycol 3350 oral powder for reconstitution: 17 gram(s) orally once a day  senna oral tablet: 2 tab(s) orally once a day (at bedtime)  sodium chloride 1 g oral tablet: 1 tab(s) orally 3 times a day   acetaminophen 325 mg oral tablet: 2 tab(s) orally every 6 hours, As needed, Temp greater or equal to 38C (100.4F), Mild Pain (1 - 3)  acetaminophen 500 mg oral tablet: 1 tab(s) orally every 6 hours, As needed, Mild Pain (1 - 3)  amLODIPine 5 mg oral tablet: 1 tab(s) orally once a day  enoxaparin: 40 unit(s) subcutaneous once a day (at bedtime)  levETIRAcetam 750 mg oral tablet: 1 tab(s) orally 2 times a day  lisinopril 10 mg oral tablet: 1 tab(s) orally once a day in pm  oxyCODONE 5 mg oral tablet: 1 tab(s) orally every 4 hours, As needed, Moderate Pain (4 - 6)  polyethylene glycol 3350 oral powder for reconstitution: 17 gram(s) orally once a day  senna oral tablet: 2 tab(s) orally once a day (at bedtime)  senna oral tablet: 2 tab(s) orally once a day (at bedtime)
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
## 2021-08-30 NOTE — PROGRESS NOTE ADULT - PROBLEM SELECTOR PLAN 1
CTH 8/27 showed an age indeterminant left frontoparietal subdural hematoma with local mass effect with effacement of the sulci in the left frontal, parietal, and temporal lobes and subfalcine herniation with a 7 mm midline shift. No parenchymal hemorrhage. Repeat CTH 8/28 was unchanged  -q4hr Neuro checks  -Keep SBP <160  -Hold ASA and other AC  -keppra 500 BID for seizure ppx, d/c EEG given return and no activity CTH 8/27 showed an age indeterminant left frontoparietal subdural hematoma with local mass effect with effacement of the sulci in the left frontal, parietal, and temporal lobes and subfalcine herniation with a 7 mm midline shift. No parenchymal hemorrhage. Repeat CTH 8/28 was unchanged  -q4hr Neuro checks  -Keep SBP <160  -Hold ASA and other AC  -keppra 500 BID for seizure ppx, d/c EEG given return and no activity  -transfer to [facility] tomorrow for embolization procedure, NPO overnight

## 2021-08-30 NOTE — PROGRESS NOTE ADULT - SUBJECTIVE AND OBJECTIVE BOX
24H hour events:   pt resting  no acute events overnight  no complaints this am  MEDICATIONS:  amLODIPine   Tablet 5 milliGRAM(s) Oral daily  labetalol Injectable 5 milliGRAM(s) IV Push every 6 hours PRN  lisinopril 5 milliGRAM(s) Oral daily    cefTRIAXone   IVPB 1000 milliGRAM(s) IV Intermittent every 24 hours      levETIRAcetam  IVPB 500 milliGRAM(s) IV Intermittent every 12 hours        chlorhexidine 4% Liquid 1 Application(s) Topical <User Schedule>          PHYSICAL EXAM:  T(C): 36.1 (08-30-21 @ 12:00), Max: 36.1 (08-30-21 @ 04:00)  HR: 82 (08-30-21 @ 13:00) (64 - 99)  BP: 155/64 (08-30-21 @ 13:00) (114/63 - 155/64)  RR: 23 (08-30-21 @ 13:00) (12 - 28)  SpO2: 100% (08-30-21 @ 13:00) (97% - 100%)  Wt(kg): --  I&O's Summary    29 Aug 2021 07:01  -  30 Aug 2021 07:00  --------------------------------------------------------  IN: 340 mL / OUT: 1200 mL / NET: -860 mL    30 Aug 2021 07:01  -  30 Aug 2021 13:19  --------------------------------------------------------  IN: 0 mL / OUT: 350 mL / NET: -350 mL        Appearance: Normal	  HEENT:   Normal oral mucosa, PERRL, EOMI	  Lymphatic: No lymphadenopathy  Cardiovascular: Normal S1 S2, No JVD, No murmurs, No edema  Respiratory: Lungs clear to auscultation	  Psychiatry: A & O x 3, Mood & affect appropriate  Gastrointestinal:  Soft, Non-tender, + BS	  Skin: No rashes, No ecchymoses, No cyanosis	  Neurologic: Non-focal  Extremities: Normal range of motion, No clubbing, cyanosis       LABS:	 	    CBC Full  -  ( 30 Aug 2021 01:42 )  WBC Count : 5.84 K/uL  Hemoglobin : 12.2 g/dL  Hematocrit : 37.2 %  Platelet Count - Automated : 194 K/uL  Mean Cell Volume : 84.9 fL  Mean Cell Hemoglobin : 27.9 pg  Mean Cell Hemoglobin Concentration : 32.8 gm/dL  Auto Neutrophil # : x  Auto Lymphocyte # : x  Auto Monocyte # : x  Auto Eosinophil # : x  Auto Basophil # : x  Auto Neutrophil % : x  Auto Lymphocyte % : x  Auto Monocyte % : x  Auto Eosinophil % : x  Auto Basophil % : x    08-30    139  |  106  |  19  ----------------------------<  144<H>  4.1   |  24  |  0.97  08-29    141  |  107  |  22  ----------------------------<  123<H>  4.0   |  23  |  1.41<H>    Ca    8.7      30 Aug 2021 01:42  Ca    9.0      29 Aug 2021 02:11  Phos  3.0     08-30  Phos  3.5     08-29  Mg     2.20     08-30  Mg     2.20     08-29    TPro  6.2  /  Alb  3.2<L>  /  TBili  0.2  /  DBili  x   /  AST  14  /  ALT  11  /  AlkPhos  56  08-30  TPro  6.1  /  Alb  3.0<L>  /  TBili  0.4  /  DBili  x   /  AST  15  /  ALT  11  /  AlkPhos  56  08-29      proBNP:   Lipid Profile:   HgA1c:   TSH:       CARDIAC MARKERS:            TELEMETRY: 	    ECG:  	  RADIOLOGY:  OTHER: 	    PREVIOUS DIAGNOSTIC TESTING:    [ ] Echocardiogram:  [ ]  Catheterization:  [ ] Stress Test:  	  	  ASSESSMENT/PLAN:

## 2021-08-30 NOTE — PROGRESS NOTE ADULT - ASSESSMENT
88 y/o M with hx of HTN, presents with subdural hematoma with subfalcine herniation and 0.7 cm rightward midline shift seen on outpatient CT head.       #Neuro    #Subdural hematoma.   - CTH 8/27 showed an age indeterminant left frontoparietal subdural hematoma with local mass effect with effacement of the sulci in the left frontal, parietal, and temporal lobes and ubfalcine herniation with a 7 mm midline shift. No parenchymal hemorrhage. Repeat CTH 8/28 was unchanged  -q1hr Neuro checks  -Keep SBP <160  -Hold ASA and other AC  -keppra 500 BID for seizure ppx, d/c EEG given return and no activity     #Syncope.   -Episode 3 months ago per wife, unclear workup  -Obtain collateral from OSH for prior w/u  -echo ordered  -monitor on telemetry  -keppra 500 BID for seizure ppx    #Cardiovascular    #Hypertension.   - continue lisinopril 10mg qd and amlodipine 5mg qd  - IV labetalol for SBP >160  - Goal SBP <160 to normotension for SDH for now    #Elevated troponin.   - pt has no chest pain, low suspicion for ACS. EKG nonischemic, monitor on telemetry  - troponin peaked  - EKG: Sinus with PAC QTC: 494 TWI in AVL, AVR  - cardiology consulted  - Monitor on tele     #QT prolongation.   - EKG showed QTC: 499  - Monitor QTc closely  - repeat EKG in AM  - avoid qt prolonging agents    #Respiratory  -O2 Sat % on RA      #GI/Nutrition  - No active issues      #/Renal    #Chronic kidney disease (CKD).   - SCr in 9/18 was 1.34.  - monitor Cr  - avoid nephrotoxic agents  - renally dose medications    #Skin  -no active issues    #ID    #UTI  - positive UA  - pending UCx  - continue ceftriaxone.    #Endocrine  - active issues    #Hematologic/DVT ppx  - No AC given subdural hematoma  - SCDs      #Ethics.  8/29: Spoke with wife 4:43pm  88 y/o M with hx of HTN, presents with subdural hematoma with subfalcine herniation and 0.7 cm rightward midline shift seen on outpatient CT head likely due to fall 6/13 (CTH at time negative for SDH).        #Neuro    #Subdural hematoma.   - CTH 8/27 showed an age indeterminant left frontoparietal subdural hematoma with local mass effect with effacement of the sulci in the left frontal, parietal, and temporal lobes and ubfalcine herniation with a 7 mm midline shift. No parenchymal hemorrhage. Repeat CTH 8/28 was unchanged  -q4hr Neuro checks  -Keep SBP <160  -Hold ASA and other AC  -keppra 500 BID for seizure ppx, EEG negative    #Cardiovascular    #Hypertension.   - continue lisinopril 10mg qd and amlodipine 5mg qd  - IV labetalol for SBP >160  - Goal SBP <160 to normotension for SDH for now    #Elevated troponin.   - pt has no chest pain, low suspicion for ACS. EKG nonischemic, monitor on telemetry  - troponin peaked  - EKG: Sinus with PAC QTC: 494 TWI in AVL, AVR  - cardiology consulted  - Monitor on tele     #QT prolongation.   - EKG showed QTC: 499  - Monitor QTc closely  - repeat EKG in AM  - avoid qt prolonging agents    #Respiratory  -O2 Sat % on RA      #GI/Nutrition  - No active issues      #/Renal    #Chronic kidney disease (CKD).   - SCr in 9/18 was 1.34.  - monitor Cr  - avoid nephrotoxic agents  - renally dose medications    #Skin  -no active issues    #ID    #UTI  - positive UA, UCx contaminated s/p Ceftriaxone    #Endocrine  - No active issues    #Hematologic/DVT ppx  - No AC given subdural hematoma  - SCDs      #Ethics.  8/29: Spoke with wife 4:43pm, 8/30 with wife

## 2021-08-31 ENCOUNTER — APPOINTMENT (OUTPATIENT)
Dept: NEUROSURGERY | Facility: HOSPITAL | Age: 86
End: 2021-08-31

## 2021-08-31 DIAGNOSIS — Z98.890 OTHER SPECIFIED POSTPROCEDURAL STATES: ICD-10-CM

## 2021-08-31 LAB
ANION GAP SERPL CALC-SCNC: 11 MMOL/L — SIGNIFICANT CHANGE UP (ref 7–14)
ANION GAP SERPL CALC-SCNC: 5 MMOL/L — LOW (ref 7–14)
APTT BLD: 29.3 SEC — SIGNIFICANT CHANGE UP (ref 27–36.3)
BLD GP AB SCN SERPL QL: NEGATIVE — SIGNIFICANT CHANGE UP
BUN SERPL-MCNC: 11 MG/DL — SIGNIFICANT CHANGE UP (ref 7–23)
BUN SERPL-MCNC: 16 MG/DL — SIGNIFICANT CHANGE UP (ref 7–23)
CALCIUM SERPL-MCNC: 8.3 MG/DL — LOW (ref 8.4–10.5)
CALCIUM SERPL-MCNC: 8.8 MG/DL — SIGNIFICANT CHANGE UP (ref 8.4–10.5)
CHLORIDE SERPL-SCNC: 106 MMOL/L — SIGNIFICANT CHANGE UP (ref 98–107)
CHLORIDE SERPL-SCNC: 108 MMOL/L — HIGH (ref 98–107)
CO2 SERPL-SCNC: 23 MMOL/L — SIGNIFICANT CHANGE UP (ref 22–31)
CO2 SERPL-SCNC: 25 MMOL/L — SIGNIFICANT CHANGE UP (ref 22–31)
CREAT SERPL-MCNC: 0.87 MG/DL — SIGNIFICANT CHANGE UP (ref 0.5–1.3)
CREAT SERPL-MCNC: 0.91 MG/DL — SIGNIFICANT CHANGE UP (ref 0.5–1.3)
GLUCOSE BLDC GLUCOMTR-MCNC: 94 MG/DL — SIGNIFICANT CHANGE UP (ref 70–99)
GLUCOSE SERPL-MCNC: 106 MG/DL — HIGH (ref 70–99)
GLUCOSE SERPL-MCNC: 98 MG/DL — SIGNIFICANT CHANGE UP (ref 70–99)
HCT VFR BLD CALC: 35.8 % — LOW (ref 39–50)
HCT VFR BLD CALC: 36.7 % — LOW (ref 39–50)
HGB BLD-MCNC: 11.8 G/DL — LOW (ref 13–17)
HGB BLD-MCNC: 12 G/DL — LOW (ref 13–17)
INR BLD: 1.07 RATIO — SIGNIFICANT CHANGE UP (ref 0.88–1.16)
MAGNESIUM SERPL-MCNC: 1.9 MG/DL — SIGNIFICANT CHANGE UP (ref 1.6–2.6)
MAGNESIUM SERPL-MCNC: 2.1 MG/DL — SIGNIFICANT CHANGE UP (ref 1.6–2.6)
MCHC RBC-ENTMCNC: 27.8 PG — SIGNIFICANT CHANGE UP (ref 27–34)
MCHC RBC-ENTMCNC: 28.2 PG — SIGNIFICANT CHANGE UP (ref 27–34)
MCHC RBC-ENTMCNC: 32.7 GM/DL — SIGNIFICANT CHANGE UP (ref 32–36)
MCHC RBC-ENTMCNC: 33 GM/DL — SIGNIFICANT CHANGE UP (ref 32–36)
MCV RBC AUTO: 85.2 FL — SIGNIFICANT CHANGE UP (ref 80–100)
MCV RBC AUTO: 85.4 FL — SIGNIFICANT CHANGE UP (ref 80–100)
NRBC # BLD: 0 /100 WBCS — SIGNIFICANT CHANGE UP
NRBC # BLD: 0 /100 WBCS — SIGNIFICANT CHANGE UP
NRBC # FLD: 0 K/UL — SIGNIFICANT CHANGE UP
NRBC # FLD: 0 K/UL — SIGNIFICANT CHANGE UP
PHOSPHATE SERPL-MCNC: 2.8 MG/DL — SIGNIFICANT CHANGE UP (ref 2.5–4.5)
PHOSPHATE SERPL-MCNC: 3 MG/DL — SIGNIFICANT CHANGE UP (ref 2.5–4.5)
PLATELET # BLD AUTO: 174 K/UL — SIGNIFICANT CHANGE UP (ref 150–400)
PLATELET # BLD AUTO: 177 K/UL — SIGNIFICANT CHANGE UP (ref 150–400)
POTASSIUM SERPL-MCNC: 4.1 MMOL/L — SIGNIFICANT CHANGE UP (ref 3.5–5.3)
POTASSIUM SERPL-MCNC: 4.1 MMOL/L — SIGNIFICANT CHANGE UP (ref 3.5–5.3)
POTASSIUM SERPL-SCNC: 4.1 MMOL/L — SIGNIFICANT CHANGE UP (ref 3.5–5.3)
POTASSIUM SERPL-SCNC: 4.1 MMOL/L — SIGNIFICANT CHANGE UP (ref 3.5–5.3)
PROTHROM AB SERPL-ACNC: 12.3 SEC — SIGNIFICANT CHANGE UP (ref 10.6–13.6)
RBC # BLD: 4.19 M/UL — LOW (ref 4.2–5.8)
RBC # BLD: 4.31 M/UL — SIGNIFICANT CHANGE UP (ref 4.2–5.8)
RBC # FLD: 13.8 % — SIGNIFICANT CHANGE UP (ref 10.3–14.5)
RBC # FLD: 13.9 % — SIGNIFICANT CHANGE UP (ref 10.3–14.5)
RH IG SCN BLD-IMP: POSITIVE — SIGNIFICANT CHANGE UP
SARS-COV-2 RNA SPEC QL NAA+PROBE: SIGNIFICANT CHANGE UP
SODIUM SERPL-SCNC: 138 MMOL/L — SIGNIFICANT CHANGE UP (ref 135–145)
SODIUM SERPL-SCNC: 140 MMOL/L — SIGNIFICANT CHANGE UP (ref 135–145)
WBC # BLD: 5.6 K/UL — SIGNIFICANT CHANGE UP (ref 3.8–10.5)
WBC # BLD: 7.62 K/UL — SIGNIFICANT CHANGE UP (ref 3.8–10.5)
WBC # FLD AUTO: 5.6 K/UL — SIGNIFICANT CHANGE UP (ref 3.8–10.5)
WBC # FLD AUTO: 7.62 K/UL — SIGNIFICANT CHANGE UP (ref 3.8–10.5)

## 2021-08-31 PROCEDURE — 61312 CRNEC/CRNOT STTL XDRL/SDRL: CPT

## 2021-08-31 PROCEDURE — 99233 SBSQ HOSP IP/OBS HIGH 50: CPT | Mod: GC

## 2021-08-31 RX ORDER — ACETAMINOPHEN 500 MG
1000 TABLET ORAL ONCE
Refills: 0 | Status: DISCONTINUED | OUTPATIENT
Start: 2021-08-31 | End: 2021-09-02

## 2021-08-31 RX ORDER — CEFAZOLIN SODIUM 1 G
2000 VIAL (EA) INJECTION EVERY 8 HOURS
Refills: 0 | Status: COMPLETED | OUTPATIENT
Start: 2021-08-31 | End: 2021-09-01

## 2021-08-31 RX ORDER — FENTANYL CITRATE 50 UG/ML
25 INJECTION INTRAVENOUS
Refills: 0 | Status: DISCONTINUED | OUTPATIENT
Start: 2021-08-31 | End: 2021-09-01

## 2021-08-31 RX ORDER — ONDANSETRON 8 MG/1
4 TABLET, FILM COATED ORAL ONCE
Refills: 0 | Status: DISCONTINUED | OUTPATIENT
Start: 2021-08-31 | End: 2021-09-01

## 2021-08-31 RX ORDER — OXYCODONE HYDROCHLORIDE 5 MG/1
5 TABLET ORAL EVERY 4 HOURS
Refills: 0 | Status: DISCONTINUED | OUTPATIENT
Start: 2021-08-31 | End: 2021-09-03

## 2021-08-31 RX ORDER — MAGNESIUM SULFATE 500 MG/ML
1 VIAL (ML) INJECTION ONCE
Refills: 0 | Status: COMPLETED | OUTPATIENT
Start: 2021-08-31 | End: 2021-08-31

## 2021-08-31 RX ORDER — OXYCODONE HYDROCHLORIDE 5 MG/1
2.5 TABLET ORAL ONCE
Refills: 0 | Status: DISCONTINUED | OUTPATIENT
Start: 2021-08-31 | End: 2021-09-01

## 2021-08-31 RX ADMIN — LEVETIRACETAM 400 MILLIGRAM(S): 250 TABLET, FILM COATED ORAL at 05:24

## 2021-08-31 RX ADMIN — LISINOPRIL 5 MILLIGRAM(S): 2.5 TABLET ORAL at 05:24

## 2021-08-31 RX ADMIN — SODIUM CHLORIDE 75 MILLILITER(S): 9 INJECTION INTRAMUSCULAR; INTRAVENOUS; SUBCUTANEOUS at 00:05

## 2021-08-31 RX ADMIN — LEVETIRACETAM 400 MILLIGRAM(S): 250 TABLET, FILM COATED ORAL at 19:41

## 2021-08-31 RX ADMIN — Medication 100 GRAM(S): at 22:38

## 2021-08-31 RX ADMIN — SODIUM CHLORIDE 75 MILLILITER(S): 9 INJECTION INTRAMUSCULAR; INTRAVENOUS; SUBCUTANEOUS at 19:00

## 2021-08-31 RX ADMIN — AMLODIPINE BESYLATE 5 MILLIGRAM(S): 2.5 TABLET ORAL at 05:24

## 2021-08-31 NOTE — PROGRESS NOTE ADULT - ASSESSMENT
88 y/o M with hx of HTN, presents with subdural hematoma with subfalcine herniation and 0.7 cm rightward midline shift seen on outpatient CT head.

## 2021-08-31 NOTE — PROGRESS NOTE ADULT - PROBLEM SELECTOR PLAN 2
-Episode 3 months ago per wife, unclear workup  -echo done, pt currently stable, no further studies indicated at this time  -keppra 500 BID for seizure ppx

## 2021-08-31 NOTE — PROGRESS NOTE ADULT - PROBLEM SELECTOR PLAN 7
DVT ppx: no AC given due to SDH  Diet: NPO after midnight tonight, transfer to [facility] tomorrow for embolization DVT ppx: no AC given due to SDH  Diet: NPO since midnight, transfer to [facility] today for embolization DVT ppx: no AC given due to SDH  Diet: NPO since midnight

## 2021-08-31 NOTE — PROGRESS NOTE ADULT - PROBLEM SELECTOR PLAN 6
- positive UA  - UCx showed some organisms, likely contamination  - continue ceftriaxone. - positive UA  - UCx showed some organisms, likely contamination  - discontinue ceftriaxone

## 2021-08-31 NOTE — PROGRESS NOTE ADULT - PROBLEM SELECTOR PLAN 8
Dispo: transfer pt to [facility] for embolization Dispo: pending stabilization

## 2021-08-31 NOTE — PROGRESS NOTE ADULT - SUBJECTIVE AND OBJECTIVE BOX
Patient is a 87y old  Male who presents with a chief complaint of subdural hematoma (30 Aug 2021 17:50)      INTERVAL HPI/OVERNIGHT EVENTS: No overnight events. Preop labwork noted by neurosurgery. Patient to be transferred to [facility] for emobolization procedure.       REVIEW OF SYSTEMS:  CONSTITUTIONAL: No fever, weight loss, or fatigue  EYES: No eye pain, visual disturbances, or discharge  ENMT:  No difficulty hearing, tinnitus, vertigo; No sinus or throat pain  NECK: No pain or stiffness  BREASTS: No pain, masses, or nipple discharge  RESPIRATORY: No cough, wheezing, chills or hemoptysis; No shortness of breath  CARDIOVASCULAR: No chest pain, palpitations, dizziness, or leg swelling  GASTROINTESTINAL: No abdominal or epigastric pain. No nausea, vomiting, or hematemesis; No diarrhea or constipation. No melena or hematochezia.  GENITOURINARY: No dysuria, frequency, hematuria, or incontinence  NEUROLOGICAL: No headaches, memory loss, loss of strength, numbness, or tremors  SKIN: No itching, burning, rashes, or lesions   LYMPH NODES: No enlarged glands  ENDOCRINE: No heat or cold intolerance; No hair loss  MUSCULOSKELETAL: No joint pain or swelling; No muscle, back, or extremity pain  PSYCHIATRIC: No depression, anxiety, mood swings, or difficulty sleeping  HEME/LYMPH: No easy bruising, or bleeding gums  ALLERGY AND IMMUNOLOGIC: No hives or eczema    FAMILY HISTORY:  No pertinent family history in first degree relatives      T(C): 36.8 (08-30-21 @ 22:58), Max: 36.8 (08-30-21 @ 22:58)  HR: 75 (08-30-21 @ 22:58) (70 - 99)  BP: 145/68 (08-30-21 @ 22:58) (118/51 - 155/64)  RR: 18 (08-30-21 @ 22:58) (15 - 23)  SpO2: 98% (08-30-21 @ 22:58) (97% - 100%)    ALLERGIES  NKDA    PHYSICAL EXAM:  GENERAL: NAD, well-groomed, well-developed  HEAD:  Atraumatic, Normocephalic  EYES: EOMI, PERRLA, conjunctiva and sclera clear  ENMT: No tonsillar erythema, exudates, or enlargement; Moist mucous membranes, Good dentition, No lesions  NECK: Supple, No JVD, Normal thyroid  NERVOUS SYSTEM:  Alert & Oriented X3, Good concentration; Motor Strength 5/5 B/L upper and lower extremities; DTRs 2+ intact and symmetric  CHEST/LUNG: Clear to percussion bilaterally; No rales, rhonchi, wheezing, or rubs  HEART: Regular rate and rhythm; No murmurs, rubs, or gallops  ABDOMEN: Soft, Nontender, Nondistended; Bowel sounds present  EXTREMITIES:  2+ Peripheral Pulses, No clubbing, cyanosis, or edema  LYMPH: No lymphadenopathy noted  SKIN: No rashes or lesions    Consultant(s) Notes Reviewed:  [x ] YES  [ ] NO  Care Discussed with Consultants/Other Providers [ x] YES  [ ] NO    LABS:                        12.0   5.60  )-----------( 177      ( 31 Aug 2021 06:52 )             36.7   08-31    138  |  108<H>  |  16  ----------------------------<  98  4.1   |  25  |  0.91    Ca    8.8      31 Aug 2021 06:52  Phos  2.8     08-31  Mg     2.10     08-31    TPro  6.2  /  Alb  3.2<L>  /  TBili  0.2  /  DBili  x   /  AST  14  /  ALT  11  /  AlkPhos  56  08-30        RADIOLOGY & ADDITIONAL TESTS:  CT Head No Cont (08.28.21 @ 10:14)  IMPRESSION:  Unchanged exam compared to prior from 8/27/2021.        Imaging Personally Reviewed:  [X ] YES  [ ] NO  amLODIPine   Tablet 5 milliGRAM(s) Oral daily  labetalol Injectable 5 milliGRAM(s) IV Push every 6 hours PRN  levETIRAcetam  IVPB 500 milliGRAM(s) IV Intermittent every 12 hours  lisinopril 5 milliGRAM(s) Oral daily  sodium chloride 0.9%. 1000 milliLiter(s) IV Continuous <Continuous>      HEALTH ISSUES - PROBLEM Dx:  Subdural hematoma    Syncope    Hypertension    Elevated troponin    QT prolongation    Urinary tract infection    Need for prophylactic measure    Discharge planning issues    Chronic kidney disease (CKD)           Patient is a 87y old  Male who presents with a chief complaint of subdural hematoma (30 Aug 2021 17:50)      INTERVAL HPI/OVERNIGHT EVENTS: No overnight events. Preop labwork noted by neurosurgery. Patient to be transferred to [facility] for emobolization procedure today. On exam, patient is in good spirits. He denies burning when urinating or abdominal pain. Denies other pain, feels well this morning. Reported good sleep overnight.       REVIEW OF SYSTEMS:  CONSTITUTIONAL: No fever, weight loss, or fatigue  EYES: No eye pain, visual disturbances, or discharge  ENMT:  No difficulty hearing, tinnitus, vertigo; No sinus or throat pain  NECK: No pain or stiffness  BREASTS: No pain, masses, or nipple discharge  RESPIRATORY: No cough, wheezing, chills or hemoptysis; No shortness of breath  CARDIOVASCULAR: No chest pain, palpitations, dizziness, or leg swelling  GASTROINTESTINAL: No abdominal or epigastric pain. No nausea, vomiting, or hematemesis; No diarrhea or constipation. No melena or hematochezia.  GENITOURINARY: No dysuria, frequency, hematuria, or incontinence  NEUROLOGICAL: No headaches, memory loss, loss of strength, numbness, or tremors  SKIN: No itching, burning, rashes, or lesions   LYMPH NODES: No enlarged glands  ENDOCRINE: No heat or cold intolerance; No hair loss  MUSCULOSKELETAL: No joint pain or swelling; No muscle, back, or extremity pain  PSYCHIATRIC: No depression, anxiety, mood swings, or difficulty sleeping  HEME/LYMPH: No easy bruising, or bleeding gums  ALLERGY AND IMMUNOLOGIC: No hives or eczema    FAMILY HISTORY:  No pertinent family history in first degree relatives      T(C): 36.8 (08-30-21 @ 22:58), Max: 36.8 (08-30-21 @ 22:58)  HR: 75 (08-30-21 @ 22:58) (70 - 99)  BP: 145/68 (08-30-21 @ 22:58) (118/51 - 155/64)  RR: 18 (08-30-21 @ 22:58) (15 - 23)  SpO2: 98% (08-30-21 @ 22:58) (97% - 100%)    ALLERGIES  NKDA    PHYSICAL EXAM:  GENERAL: NAD, well-groomed, well-developed  HEAD:  Atraumatic, Normocephalic  EYES: EOMI, PERRLA, conjunctiva and sclera clear  ENMT: No tonsillar erythema, exudates, or enlargement; Moist mucous membranes, Good dentition, No lesions  NECK: Supple, No JVD, Normal thyroid  NERVOUS SYSTEM:  Alert & Oriented X3, Good concentration; Motor Strength 5/5 B/L upper and lower extremities; DTRs 2+ intact and symmetric  CHEST/LUNG: Clear to percussion bilaterally; No rales, rhonchi, wheezing, or rubs  HEART: Regular rate and rhythm; No murmurs, rubs, or gallops  ABDOMEN: Soft, Nontender, Nondistended; Bowel sounds present  EXTREMITIES:  2+ Peripheral Pulses, No clubbing, cyanosis, or edema  LYMPH: No lymphadenopathy noted  SKIN: No rashes or lesions    Consultant(s) Notes Reviewed:  [x ] YES  [ ] NO  Care Discussed with Consultants/Other Providers [ x] YES  [ ] NO    LABS:                        12.0   5.60  )-----------( 177      ( 31 Aug 2021 06:52 )             36.7   08-31    138  |  108<H>  |  16  ----------------------------<  98  4.1   |  25  |  0.91    Ca    8.8      31 Aug 2021 06:52  Phos  2.8     08-31  Mg     2.10     08-31    TPro  6.2  /  Alb  3.2<L>  /  TBili  0.2  /  DBili  x   /  AST  14  /  ALT  11  /  AlkPhos  56  08-30        RADIOLOGY & ADDITIONAL TESTS:  CT Head No Cont (08.28.21 @ 10:14)  IMPRESSION:  Unchanged exam compared to prior from 8/27/2021.        Imaging Personally Reviewed:  [X ] YES  [ ] NO  amLODIPine   Tablet 5 milliGRAM(s) Oral daily  labetalol Injectable 5 milliGRAM(s) IV Push every 6 hours PRN  levETIRAcetam  IVPB 500 milliGRAM(s) IV Intermittent every 12 hours  lisinopril 5 milliGRAM(s) Oral daily  sodium chloride 0.9%. 1000 milliLiter(s) IV Continuous <Continuous>      HEALTH ISSUES - PROBLEM Dx:  Subdural hematoma    Syncope    Hypertension    Elevated troponin    QT prolongation    Urinary tract infection    Need for prophylactic measure    Discharge planning issues    Chronic kidney disease (CKD)           Patient is a 87y old  Male who presents with a chief complaint of subdural hematoma (30 Aug 2021 17:50)      INTERVAL HPI/OVERNIGHT EVENTS: No overnight events. Preop labwork noted by neurosurgery. Patient to have procedure done today. On exam, patient is in good spirits. He denies burning when urinating or abdominal pain. Denies other pain, feels well this morning. Reported good sleep overnight.       REVIEW OF SYSTEMS:  CONSTITUTIONAL: No fever, weight loss, or fatigue  EYES: No eye pain, visual disturbances, or discharge  ENMT:  No difficulty hearing, tinnitus, vertigo; No sinus or throat pain  NECK: No pain or stiffness  BREASTS: No pain, masses, or nipple discharge  RESPIRATORY: No cough, wheezing, chills or hemoptysis; No shortness of breath  CARDIOVASCULAR: No chest pain, palpitations, dizziness, or leg swelling  GASTROINTESTINAL: No abdominal or epigastric pain. No nausea, vomiting, or hematemesis; No diarrhea or constipation. No melena or hematochezia.  GENITOURINARY: No dysuria, frequency, hematuria, or incontinence  NEUROLOGICAL: No headaches, memory loss, loss of strength, numbness, or tremors  SKIN: No itching, burning, rashes, or lesions   LYMPH NODES: No enlarged glands  ENDOCRINE: No heat or cold intolerance; No hair loss  MUSCULOSKELETAL: No joint pain or swelling; No muscle, back, or extremity pain  PSYCHIATRIC: No depression, anxiety, mood swings, or difficulty sleeping  HEME/LYMPH: No easy bruising, or bleeding gums  ALLERGY AND IMMUNOLOGIC: No hives or eczema    FAMILY HISTORY:  No pertinent family history in first degree relatives      T(C): 36.8 (08-30-21 @ 22:58), Max: 36.8 (08-30-21 @ 22:58)  HR: 75 (08-30-21 @ 22:58) (70 - 99)  BP: 145/68 (08-30-21 @ 22:58) (118/51 - 155/64)  RR: 18 (08-30-21 @ 22:58) (15 - 23)  SpO2: 98% (08-30-21 @ 22:58) (97% - 100%)    ALLERGIES  NKDA    PHYSICAL EXAM:  GENERAL: NAD, well-groomed, well-developed  HEAD:  Atraumatic, Normocephalic  EYES: EOMI, PERRLA, conjunctiva and sclera clear  ENMT: No tonsillar erythema, exudates, or enlargement; Moist mucous membranes, Good dentition, No lesions  NECK: Supple, No JVD, Normal thyroid  NERVOUS SYSTEM:  Alert & Oriented X3, Good concentration; Motor Strength 5/5 B/L upper and lower extremities; DTRs 2+ intact and symmetric  CHEST/LUNG: Clear to percussion bilaterally; No rales, rhonchi, wheezing, or rubs  HEART: Regular rate and rhythm; No murmurs, rubs, or gallops  ABDOMEN: Soft, Nontender, Nondistended; Bowel sounds present  EXTREMITIES:  2+ Peripheral Pulses, No clubbing, cyanosis, or edema  LYMPH: No lymphadenopathy noted  SKIN: No rashes or lesions    Consultant(s) Notes Reviewed:  [x ] YES  [ ] NO  Care Discussed with Consultants/Other Providers [ x] YES  [ ] NO    LABS:                        12.0   5.60  )-----------( 177      ( 31 Aug 2021 06:52 )             36.7   08-31    138  |  108<H>  |  16  ----------------------------<  98  4.1   |  25  |  0.91    Ca    8.8      31 Aug 2021 06:52  Phos  2.8     08-31  Mg     2.10     08-31    TPro  6.2  /  Alb  3.2<L>  /  TBili  0.2  /  DBili  x   /  AST  14  /  ALT  11  /  AlkPhos  56  08-30        RADIOLOGY & ADDITIONAL TESTS:  CT Head No Cont (08.28.21 @ 10:14)  IMPRESSION:  Unchanged exam compared to prior from 8/27/2021.        Imaging Personally Reviewed:  [X ] YES  [ ] NO  amLODIPine   Tablet 5 milliGRAM(s) Oral daily  labetalol Injectable 5 milliGRAM(s) IV Push every 6 hours PRN  levETIRAcetam  IVPB 500 milliGRAM(s) IV Intermittent every 12 hours  lisinopril 5 milliGRAM(s) Oral daily  sodium chloride 0.9%. 1000 milliLiter(s) IV Continuous <Continuous>      HEALTH ISSUES - PROBLEM Dx:  Subdural hematoma    Syncope    Hypertension    Elevated troponin    QT prolongation    Urinary tract infection    Need for prophylactic measure    Discharge planning issues    Chronic kidney disease (CKD)

## 2021-08-31 NOTE — PROGRESS NOTE ADULT - PROBLEM SELECTOR PLAN 2
Neurologic checks Q1H overnight  Monitor drain output  Noncontrast head CT  Possible transfer to [facility] tomorrow for EMBOLIZE trial

## 2021-08-31 NOTE — PROGRESS NOTE ADULT - PROBLEM SELECTOR PLAN 4
- pt has no chest pain, low suspicion for ACS. EKG nonischemic  - troponin downtrending, last measured 8/28 @ 89  - EKG: Sinus with PAC QTC: 499 TWI in AVL, AVR  - cardiology consulted, recs appreciated

## 2021-08-31 NOTE — CHART NOTE - NSCHARTNOTESELECT_GEN_ALL_CORE
MICU Transfer Note/Event Note
EEG preliminary report
Event Note
Hospitalist/Event Note
[facility] ACCEPT NOTE/Event Note
MICU Accept/Event Note
RRT/Event Note
neurosurgery preop/Event Note

## 2021-08-31 NOTE — PROGRESS NOTE ADULT - PROBLEM SELECTOR PLAN 5
- EKG showed QTC: 499  - Monitor QTc closely  - repeat EKG in AM  - avoid qt prolonging agents
EKG showed QTC: 499  Monitor QTc closely  repeat EKG in AM  avoid qt prolonging agents
- EKG showed QTC: 499  - Monitor QTc closely  - repeat EKG in AM  - avoid qt prolonging agents

## 2021-08-31 NOTE — CHART NOTE - NSCHARTNOTEFT_GEN_A_CORE
12.2   5.84  )-----------( 194      ( 30 Aug 2021 01:42 )             37.2     08-30    139  |  106  |  19  ----------------------------<  144<H>  4.1   |  24  |  0.97    Ca    8.7      30 Aug 2021 01:42  Phos  3.0     08-30  Mg     2.20     08-30    TPro  6.2  /  Alb  3.2<L>  /  TBili  0.2  /  DBili  x   /  AST  14  /  ALT  11  /  AlkPhos  56  08-30    PT/INR - ( 30 Aug 2021 01:42 )   PT: 12.8 sec;   INR: 1.13 ratio         PTT - ( 30 Aug 2021 01:42 )  PTT:23.4 sec    Type + Screen (08.27.21 @ 20:41)    ABO Interpretation: B    Rh Interpretation: Positive    Antibody Screen: Negative    COVID-19 PCR: Jagruti (27 Aug 2021 20:36)
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
## 2021-08-31 NOTE — BRIEF OPERATIVE NOTE - NSICDXBRIEFPROCEDURE_GEN_ALL_CORE_FT
PROCEDURES:  Trephination or craniotomy for subdural hematoma 31-Aug-2021 16:17:52  Jean Claude Christopher

## 2021-08-31 NOTE — CHART NOTE - NSCHARTNOTEFT_GEN_A_CORE
Spoke with Neurosurgery resident on call. Patient currently in OR for evacuation of subdural hematoma and will recover in SICU. Patient planned for transfer to [facility] for embolization.

## 2021-08-31 NOTE — PROGRESS NOTE ADULT - PROBLEM SELECTOR PLAN 9
- SCr downtrended to 0.97 on 8/30  - monitor Cr  - avoid nephrotoxic agents  - renally dose medications
1.  Name of PCP: Dr. Ruano  2.  PCP Contacted on Admission: [ ] Y    [X ] N    3.  PCP contacted at Discharge: [ ] Y    [ ] N    [ ] N/A  4.  Post-Discharge Appointment Date and Location:  5.  Summary of Handoff given to PCP:
- SCr downtrended to 0.97 on 8/30  - monitor Cr  - avoid nephrotoxic agents  - renally dose medications

## 2021-08-31 NOTE — PROGRESS NOTE ADULT - SUBJECTIVE AND OBJECTIVE BOX
Neurosurgery postop   patient feeling well, no C/O  ICU Vital Signs Last 24 Hrs  T(C): 36.7 (31 Aug 2021 18:20), Max: 37.4 (31 Aug 2021 16:16)  T(F): 98.1 (31 Aug 2021 18:20), Max: 99.3 (31 Aug 2021 16:16)  HR: 80 (31 Aug 2021 19:45) (75 - 87)  BP: 128/72 (31 Aug 2021 19:45) (106/48 - 160/90)  BP(mean): 85 (31 Aug 2021 19:45) (55 - 85)  ABP: --  ABP(mean): --  RR: 13 (31 Aug 2021 19:45) (13 - 20)  SpO2: 95% (31 Aug 2021 19:45) (95% - 100%)    AAO X 3  PERRLA, EOMI  CN 2-12 grossly intact  ROBERTSON strength 5/5, no drift  SILT    Dressing C/D/I  RAY: 30cc    MEDICATIONS  (STANDING):  amLODIPine   Tablet 5 milliGRAM(s) Oral daily  ceFAZolin   IVPB 2000 milliGRAM(s) IV Intermittent every 8 hours  levETIRAcetam  IVPB 500 milliGRAM(s) IV Intermittent every 12 hours  lisinopril 5 milliGRAM(s) Oral daily  sodium chloride 0.9%. 1000 milliLiter(s) (75 mL/Hr) IV Continuous <Continuous>    MEDICATIONS  (PRN):  acetaminophen  IVPB .. 1000 milliGRAM(s) IV Intermittent once PRN Moderate Pain (4 - 6)  fentaNYL    Injectable 25 MICROGram(s) IV Push every 5 minutes PRN Moderate Pain (4 - 6)  labetalol Injectable 5 milliGRAM(s) IV Push every 6 hours PRN Systolic blood pressure >160  ondansetron Injectable 4 milliGRAM(s) IV Push once PRN Nausea and/or Vomiting  oxyCODONE    IR 2.5 milliGRAM(s) Oral once PRN Moderate Pain (4 - 6)  oxyCODONE    IR 5 milliGRAM(s) Oral every 4 hours PRN Moderate Pain (4 - 6)                          12.0   5.60  )-----------( 177      ( 31 Aug 2021 06:52 )             36.7     08-31    138  |  108<H>  |  16  ----------------------------<  98  4.1   |  25  |  0.91    Ca    8.8      31 Aug 2021 06:52  Phos  2.8     08-31  Mg     2.10     08-31    TPro  6.2  /  Alb  3.2<L>  /  TBili  0.2  /  DBili  x   /  AST  14  /  ALT  11  /  AlkPhos  56  08-30

## 2021-08-31 NOTE — PROGRESS NOTE ADULT - PROBLEM SELECTOR PLAN 1
CTH 8/27 showed an age indeterminant left frontoparietal subdural hematoma with local mass effect with effacement of the sulci in the left frontal, parietal, and temporal lobes and subfalcine herniation with a 7 mm midline shift. No parenchymal hemorrhage. Repeat CTH 8/28 was unchanged  -q4hr Neuro checks  -Keep SBP <160  -Hold ASA and other AC  -keppra 500 BID for seizure ppx, d/c EEG given return and no activity  -transfer to [facility] tomorrow for embolization procedure, NPO overnight CTH 8/27 showed an age indeterminant left frontoparietal subdural hematoma with local mass effect with effacement of the sulci in the left frontal, parietal, and temporal lobes and subfalcine herniation with a 7 mm midline shift. No parenchymal hemorrhage. Repeat CTH 8/28 was unchanged  -q4hr Neuro checks  -Keep SBP <160  -Hold ASA and other AC  -keppra 500 BID for seizure ppx, d/c EEG given return and no activity  -transfer to [facility] today for embolization procedure, NPO since midnight CTH 8/27 showed an age indeterminant left frontoparietal subdural hematoma with local mass effect with effacement of the sulci in the left frontal, parietal, and temporal lobes and subfalcine herniation with a 7 mm midline shift. No parenchymal hemorrhage. Repeat CTH 8/28 was unchanged  -q4hr Neuro checks  -Keep SBP <160  -Hold ASA and other AC  -keppra 500 BID for seizure ppx, d/c EEG given return and no activity  - [place] hole procedure, NPO since midnight

## 2021-09-01 LAB
ANION GAP SERPL CALC-SCNC: 10 MMOL/L — SIGNIFICANT CHANGE UP (ref 7–14)
BUN SERPL-MCNC: 12 MG/DL — SIGNIFICANT CHANGE UP (ref 7–23)
CALCIUM SERPL-MCNC: 8.7 MG/DL — SIGNIFICANT CHANGE UP (ref 8.4–10.5)
CHLORIDE SERPL-SCNC: 103 MMOL/L — SIGNIFICANT CHANGE UP (ref 98–107)
CO2 SERPL-SCNC: 23 MMOL/L — SIGNIFICANT CHANGE UP (ref 22–31)
CREAT SERPL-MCNC: 0.91 MG/DL — SIGNIFICANT CHANGE UP (ref 0.5–1.3)
GLUCOSE SERPL-MCNC: 134 MG/DL — HIGH (ref 70–99)
HCT VFR BLD CALC: 36.4 % — LOW (ref 39–50)
HGB BLD-MCNC: 12.3 G/DL — LOW (ref 13–17)
MAGNESIUM SERPL-MCNC: 2.3 MG/DL — SIGNIFICANT CHANGE UP (ref 1.6–2.6)
MCHC RBC-ENTMCNC: 28.7 PG — SIGNIFICANT CHANGE UP (ref 27–34)
MCHC RBC-ENTMCNC: 33.8 GM/DL — SIGNIFICANT CHANGE UP (ref 32–36)
MCV RBC AUTO: 85 FL — SIGNIFICANT CHANGE UP (ref 80–100)
NRBC # BLD: 0 /100 WBCS — SIGNIFICANT CHANGE UP
NRBC # FLD: 0 K/UL — SIGNIFICANT CHANGE UP
PHOSPHATE SERPL-MCNC: 3.4 MG/DL — SIGNIFICANT CHANGE UP (ref 2.5–4.5)
PLATELET # BLD AUTO: 172 K/UL — SIGNIFICANT CHANGE UP (ref 150–400)
POTASSIUM SERPL-MCNC: 4.5 MMOL/L — SIGNIFICANT CHANGE UP (ref 3.5–5.3)
POTASSIUM SERPL-SCNC: 4.5 MMOL/L — SIGNIFICANT CHANGE UP (ref 3.5–5.3)
RBC # BLD: 4.28 M/UL — SIGNIFICANT CHANGE UP (ref 4.2–5.8)
RBC # FLD: 13.8 % — SIGNIFICANT CHANGE UP (ref 10.3–14.5)
SODIUM SERPL-SCNC: 136 MMOL/L — SIGNIFICANT CHANGE UP (ref 135–145)
WBC # BLD: 5.77 K/UL — SIGNIFICANT CHANGE UP (ref 3.8–10.5)
WBC # FLD AUTO: 5.77 K/UL — SIGNIFICANT CHANGE UP (ref 3.8–10.5)

## 2021-09-01 PROCEDURE — 99291 CRITICAL CARE FIRST HOUR: CPT

## 2021-09-01 PROCEDURE — 70450 CT HEAD/BRAIN W/O DYE: CPT | Mod: 26

## 2021-09-01 RX ORDER — SODIUM CHLORIDE 9 MG/ML
1 INJECTION INTRAMUSCULAR; INTRAVENOUS; SUBCUTANEOUS THREE TIMES A DAY
Refills: 0 | Status: DISCONTINUED | OUTPATIENT
Start: 2021-09-01 | End: 2021-09-03

## 2021-09-01 RX ORDER — SENNA PLUS 8.6 MG/1
2 TABLET ORAL AT BEDTIME
Refills: 0 | Status: DISCONTINUED | OUTPATIENT
Start: 2021-09-01 | End: 2021-09-03

## 2021-09-01 RX ORDER — POLYETHYLENE GLYCOL 3350 17 G/17G
17 POWDER, FOR SOLUTION ORAL DAILY
Refills: 0 | Status: DISCONTINUED | OUTPATIENT
Start: 2021-09-01 | End: 2021-09-03

## 2021-09-01 RX ADMIN — SENNA PLUS 2 TABLET(S): 8.6 TABLET ORAL at 21:01

## 2021-09-01 RX ADMIN — POLYETHYLENE GLYCOL 3350 17 GRAM(S): 17 POWDER, FOR SOLUTION ORAL at 13:35

## 2021-09-01 RX ADMIN — SODIUM CHLORIDE 1 GRAM(S): 9 INJECTION INTRAMUSCULAR; INTRAVENOUS; SUBCUTANEOUS at 21:00

## 2021-09-01 RX ADMIN — Medication 100 MILLIGRAM(S): at 09:02

## 2021-09-01 RX ADMIN — LEVETIRACETAM 400 MILLIGRAM(S): 250 TABLET, FILM COATED ORAL at 17:20

## 2021-09-01 RX ADMIN — LISINOPRIL 5 MILLIGRAM(S): 2.5 TABLET ORAL at 05:29

## 2021-09-01 RX ADMIN — LEVETIRACETAM 400 MILLIGRAM(S): 250 TABLET, FILM COATED ORAL at 05:30

## 2021-09-01 RX ADMIN — AMLODIPINE BESYLATE 5 MILLIGRAM(S): 2.5 TABLET ORAL at 05:30

## 2021-09-01 RX ADMIN — SODIUM CHLORIDE 1 GRAM(S): 9 INJECTION INTRAMUSCULAR; INTRAVENOUS; SUBCUTANEOUS at 13:35

## 2021-09-01 RX ADMIN — Medication 100 MILLIGRAM(S): at 00:06

## 2021-09-01 NOTE — PROGRESS NOTE ADULT - ASSESSMENT
86 YO male postop left sided jewel hole for evacuation of chronic SDH, subdural RAY in place    9/1: Stable exam POD # 1 S/P left sided jewel hole for evacuation of chronic SDH, subdural RAY in place

## 2021-09-01 NOTE — PROGRESS NOTE ADULT - SUBJECTIVE AND OBJECTIVE BOX
ANESTHESIA POSTOP CHECK    87y Male POSTOP DAY 1 S/P Left Crainiotomy    Vital Signs Last 24 Hrs  T(C): 36.4 (01 Sep 2021 04:00), Max: 37.4 (31 Aug 2021 16:16)  T(F): 97.6 (01 Sep 2021 04:00), Max: 99.3 (31 Aug 2021 16:16)  HR: 72 (01 Sep 2021 10:00) (67 - 87)  BP: 137/71 (01 Sep 2021 10:00) (106/48 - 160/90)  BP(mean): 88 (01 Sep 2021 10:00) (55 - 93)  RR: 13 (01 Sep 2021 10:00) (11 - 20)  SpO2: 95% (01 Sep 2021 10:00) (94% - 100%)  I&O's Summary    31 Aug 2021 07:01  -  01 Sep 2021 07:00  --------------------------------------------------------  IN: 1100 mL / OUT: 710 mL / NET: 390 mL    01 Sep 2021 07:01  -  01 Sep 2021 10:34  --------------------------------------------------------  IN: 75 mL / OUT: 0 mL / NET: 75 mL        [X] NO APPARENT ANESTHESIA COMPLICATIONS      Comments:
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
## 2021-09-01 NOTE — PROGRESS NOTE ADULT - ASSESSMENT
ASSESSMENT:  87y Male s/p L craniotomy with drain placement for subdural hematoma    PLAN:   Neurologic:   - ANO x1, resting comfortably  - q1 neurovascular checks   - f/u CTH tomorrow morning 9/2 at 5am  - pain control  - keppra BID    Respiratory:   - saturating well on 2: NC  - monitor SpO2    Cardiovascular:   -SBP goal 110-160  - no pressor requirement at this time    Gastrointestinal/Nutrition:   -NPO post-op; switched to soft mechanical diet 9/1    Renal/Genitourinary:   - NS at 75 cc/hr  - monitor electrolytes  - Na 135-145  - salt tabs    Hematologic:   - H/H stable  - monitor CBC  - no chemical DVT or AC ppx; start tomorrow 9/2    Infectious Disease:   - ancef perioperatively  - monitor WBC and fever curve    Lines/Tubes:   -EVD    Endocrine:   - monitor sugar    Disposition:   SICU  -------------------   ASSESSMENT:  87y Male s/p L craniotomy with drain placement for subdural hematoma    Interval events:  -diet switched to soft mechanical  -started on salt tabs  -order CTH for tomorrow morning 9/2 @ 5am  -start AC/DVTppx tomorrow    PLAN:   Neurologic:   - ANO x1, resting comfortably  - q1 neurovascular checks   - f/u CTH tomorrow morning 9/2 at 5am  - pain control  - keppra BID    Respiratory:   - saturating well on 2: NC  - monitor SpO2    Cardiovascular:   -SBP goal 110-160  - no pressor requirement at this time    Gastrointestinal/Nutrition:   -NPO post-op; switched to soft mechanical diet 9/1    Renal/Genitourinary:   - NS at 75 cc/hr  - monitor electrolytes  - Na 135-145  - salt tabs    Hematologic:   - H/H stable  - monitor CBC  - no chemical DVT or AC ppx; start tomorrow 9/2    Infectious Disease:   - ancef perioperatively  - monitor WBC and fever curve    Lines/Tubes:   -EVD    Endocrine:   - monitor sugar    Disposition:   SICU  -------------------

## 2021-09-01 NOTE — PROGRESS NOTE ADULT - PROBLEM SELECTOR PLAN 2
Neurologic checks Q1H overnight  Monitor drain output  Noncontrast head CT in AM  Possible transfer to [facility] for EMBOLIZE trial  Advance diet  Increase activity  PT consult
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
## 2021-09-02 LAB
ANION GAP SERPL CALC-SCNC: 13 MMOL/L — SIGNIFICANT CHANGE UP (ref 7–14)
BUN SERPL-MCNC: 16 MG/DL — SIGNIFICANT CHANGE UP (ref 7–23)
CALCIUM SERPL-MCNC: 8.5 MG/DL — SIGNIFICANT CHANGE UP (ref 8.4–10.5)
CHLORIDE SERPL-SCNC: 102 MMOL/L — SIGNIFICANT CHANGE UP (ref 98–107)
CO2 SERPL-SCNC: 22 MMOL/L — SIGNIFICANT CHANGE UP (ref 22–31)
CREAT SERPL-MCNC: 0.94 MG/DL — SIGNIFICANT CHANGE UP (ref 0.5–1.3)
GLUCOSE SERPL-MCNC: 110 MG/DL — HIGH (ref 70–99)
HCT VFR BLD CALC: 35.2 % — LOW (ref 39–50)
HGB BLD-MCNC: 11.7 G/DL — LOW (ref 13–17)
MAGNESIUM SERPL-MCNC: 2.2 MG/DL — SIGNIFICANT CHANGE UP (ref 1.6–2.6)
MCHC RBC-ENTMCNC: 28 PG — SIGNIFICANT CHANGE UP (ref 27–34)
MCHC RBC-ENTMCNC: 33.2 GM/DL — SIGNIFICANT CHANGE UP (ref 32–36)
MCV RBC AUTO: 84.2 FL — SIGNIFICANT CHANGE UP (ref 80–100)
NRBC # BLD: 0 /100 WBCS — SIGNIFICANT CHANGE UP
NRBC # FLD: 0 K/UL — SIGNIFICANT CHANGE UP
PHOSPHATE SERPL-MCNC: 2.4 MG/DL — LOW (ref 2.5–4.5)
PLATELET # BLD AUTO: 198 K/UL — SIGNIFICANT CHANGE UP (ref 150–400)
POTASSIUM SERPL-MCNC: 4.5 MMOL/L — SIGNIFICANT CHANGE UP (ref 3.5–5.3)
POTASSIUM SERPL-SCNC: 4.5 MMOL/L — SIGNIFICANT CHANGE UP (ref 3.5–5.3)
RBC # BLD: 4.18 M/UL — LOW (ref 4.2–5.8)
RBC # FLD: 13.6 % — SIGNIFICANT CHANGE UP (ref 10.3–14.5)
SARS-COV-2 RNA SPEC QL NAA+PROBE: SIGNIFICANT CHANGE UP
SODIUM SERPL-SCNC: 137 MMOL/L — SIGNIFICANT CHANGE UP (ref 135–145)
WBC # BLD: 6.66 K/UL — SIGNIFICANT CHANGE UP (ref 3.8–10.5)
WBC # FLD AUTO: 6.66 K/UL — SIGNIFICANT CHANGE UP (ref 3.8–10.5)

## 2021-09-02 PROCEDURE — ZZZZZ: CPT

## 2021-09-02 PROCEDURE — 70450 CT HEAD/BRAIN W/O DYE: CPT | Mod: 26

## 2021-09-02 RX ORDER — HEPARIN SODIUM 5000 [USP'U]/ML
5000 INJECTION INTRAVENOUS; SUBCUTANEOUS EVERY 12 HOURS
Refills: 0 | Status: DISCONTINUED | OUTPATIENT
Start: 2021-09-02 | End: 2021-09-03

## 2021-09-02 RX ORDER — ACETAMINOPHEN 500 MG
500 TABLET ORAL EVERY 6 HOURS
Refills: 0 | Status: DISCONTINUED | OUTPATIENT
Start: 2021-09-02 | End: 2021-09-03

## 2021-09-02 RX ADMIN — POLYETHYLENE GLYCOL 3350 17 GRAM(S): 17 POWDER, FOR SOLUTION ORAL at 11:52

## 2021-09-02 RX ADMIN — SENNA PLUS 2 TABLET(S): 8.6 TABLET ORAL at 22:08

## 2021-09-02 RX ADMIN — SODIUM CHLORIDE 1 GRAM(S): 9 INJECTION INTRAMUSCULAR; INTRAVENOUS; SUBCUTANEOUS at 05:27

## 2021-09-02 RX ADMIN — AMLODIPINE BESYLATE 5 MILLIGRAM(S): 2.5 TABLET ORAL at 05:25

## 2021-09-02 RX ADMIN — SODIUM CHLORIDE 1 GRAM(S): 9 INJECTION INTRAMUSCULAR; INTRAVENOUS; SUBCUTANEOUS at 22:08

## 2021-09-02 RX ADMIN — LEVETIRACETAM 400 MILLIGRAM(S): 250 TABLET, FILM COATED ORAL at 05:25

## 2021-09-02 RX ADMIN — Medication 63.75 MILLIMOLE(S): at 05:28

## 2021-09-02 RX ADMIN — LEVETIRACETAM 400 MILLIGRAM(S): 250 TABLET, FILM COATED ORAL at 17:23

## 2021-09-02 RX ADMIN — LISINOPRIL 5 MILLIGRAM(S): 2.5 TABLET ORAL at 05:25

## 2021-09-02 RX ADMIN — SODIUM CHLORIDE 1 GRAM(S): 9 INJECTION INTRAMUSCULAR; INTRAVENOUS; SUBCUTANEOUS at 14:26

## 2021-09-02 RX ADMIN — HEPARIN SODIUM 5000 UNIT(S): 5000 INJECTION INTRAVENOUS; SUBCUTANEOUS at 17:23

## 2021-09-02 NOTE — PROGRESS NOTE ADULT - PROBLEM SELECTOR PLAN 2
Neurologic checks Q1H overnight  Monitor drain output  Possible transfer to [facility] for EMBOLIZE trial after discussing with family today  Increase activity  PT consult
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
## 2021-09-02 NOTE — PROGRESS NOTE ADULT - SUBJECTIVE AND OBJECTIVE BOX
POD # 2 S/P left jewel hole for evacuation of SDH  No issues overnight    ICU Vital Signs Last 24 Hrs  T(C): 36 (02 Sep 2021 04:00), Max: 36.4 (01 Sep 2021 12:00)  T(F): 96.8 (02 Sep 2021 04:00), Max: 97.6 (01 Sep 2021 12:00)  HR: 73 (02 Sep 2021 05:00) (67 - 91)  BP: 145/74 (02 Sep 2021 05:00) (125/58 - 157/92)  BP(mean): 93 (02 Sep 2021 05:00) (75 - 108)  ABP: --  ABP(mean): --  RR: 12 (02 Sep 2021 05:00) (11 - 21)  SpO2: 96% (02 Sep 2021 05:00) (94% - 100%)      AAO X 3  PERRLA, EOMI  CN 2-12 grossly intact  ROBERTSON strength 5/5, no drift  SILT    Subdural RAY: 25cc    MEDICATIONS  (STANDING):  amLODIPine   Tablet 5 milliGRAM(s) Oral daily  levETIRAcetam  IVPB 500 milliGRAM(s) IV Intermittent every 12 hours  lisinopril 5 milliGRAM(s) Oral daily  polyethylene glycol 3350 17 Gram(s) Oral daily  senna 2 Tablet(s) Oral at bedtime  sodium chloride   Oral Tab/Cap - Peds 1 Gram(s) Oral three times a day    MEDICATIONS  (PRN):  acetaminophen  IVPB .. 1000 milliGRAM(s) IV Intermittent once PRN Moderate Pain (4 - 6)  labetalol Injectable 5 milliGRAM(s) IV Push every 6 hours PRN Systolic blood pressure >160  oxyCODONE    IR 5 milliGRAM(s) Oral every 4 hours PRN Moderate Pain (4 - 6)                                        11.7   6.66  )-----------( 198      ( 02 Sep 2021 01:37 )             35.2     09-02    137  |  102  |  16  ----------------------------<  110<H>  4.5   |  22  |  0.94    Ca    8.5      02 Sep 2021 01:37  Phos  2.4     09-02  Mg     2.20     09-02

## 2021-09-02 NOTE — PROGRESS NOTE ADULT - ASSESSMENT
ASSESSMENT:  87y Male s/p L craniotomy with drain placement for subdural hematoma    Interval events:  -f/u CTH 9/2: Stable follow-up CT examination when compared with 9/1/2021.  -regular diet  -subQ heparin BID    PLAN:   Neurologic:   - ANO x1, resting comfortably  - q1 neurovascular checks   - CTH in AM for follow up  - pain control  - keppra BID    Respiratory:   - saturating well on 2: NC  - monitor SpO2    Cardiovascular:   -SBP goal 110-160  - no pressor requirement at this time    Gastrointestinal/Nutrition:   -NPO    Renal/Genitourinary:   - NS at 75 cc/hr  - monitor electrolytes  - Na 135-145, salt tabs started    Hematologic:   - H/H stable  - monitor CBC  - no chemical DVT or AC ppx    Infectious Disease:   - ancef perioperatively  - monitor WBC and fever curve    Lines/Tubes:   -EVD    Endocrine:   - monitor sugar    Disposition:   SICU

## 2021-09-02 NOTE — PROGRESS NOTE ADULT - ASSESSMENT
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

## 2021-09-03 ENCOUNTER — INPATIENT (INPATIENT)
Facility: HOSPITAL | Age: 86
LOS: 6 days | Discharge: NURSING, REHAB OR LONG-TERM CARE | DRG: 25 | End: 2021-09-10
Attending: NEUROLOGICAL SURGERY | Admitting: NEUROLOGICAL SURGERY
Payer: MEDICARE

## 2021-09-03 ENCOUNTER — RESULT REVIEW (OUTPATIENT)
Age: 86
End: 2021-09-03

## 2021-09-03 VITALS
SYSTOLIC BLOOD PRESSURE: 130 MMHG | OXYGEN SATURATION: 95 % | RESPIRATION RATE: 15 BRPM | HEART RATE: 76 BPM | WEIGHT: 179.9 LBS | DIASTOLIC BLOOD PRESSURE: 67 MMHG | HEIGHT: 73 IN | TEMPERATURE: 98 F

## 2021-09-03 VITALS
HEART RATE: 82 BPM | SYSTOLIC BLOOD PRESSURE: 153 MMHG | OXYGEN SATURATION: 99 % | TEMPERATURE: 98 F | RESPIRATION RATE: 12 BRPM | DIASTOLIC BLOOD PRESSURE: 75 MMHG

## 2021-09-03 DIAGNOSIS — N28.9 DISORDER OF KIDNEY AND URETER, UNSPECIFIED: ICD-10-CM

## 2021-09-03 DIAGNOSIS — I74.9 EMBOLISM AND THROMBOSIS OF UNSPECIFIED ARTERY: ICD-10-CM

## 2021-09-03 LAB
ANION GAP SERPL CALC-SCNC: 11 MMOL/L — SIGNIFICANT CHANGE UP (ref 7–14)
ANION GAP SERPL CALC-SCNC: 12 MMOL/L — SIGNIFICANT CHANGE UP (ref 5–17)
BUN SERPL-MCNC: 13 MG/DL — SIGNIFICANT CHANGE UP (ref 7–23)
BUN SERPL-MCNC: 17 MG/DL — SIGNIFICANT CHANGE UP (ref 7–23)
CALCIUM SERPL-MCNC: 8.7 MG/DL — SIGNIFICANT CHANGE UP (ref 8.4–10.5)
CALCIUM SERPL-MCNC: 9.2 MG/DL — SIGNIFICANT CHANGE UP (ref 8.4–10.5)
CHLORIDE SERPL-SCNC: 103 MMOL/L — SIGNIFICANT CHANGE UP (ref 96–108)
CHLORIDE SERPL-SCNC: 103 MMOL/L — SIGNIFICANT CHANGE UP (ref 98–107)
CO2 SERPL-SCNC: 22 MMOL/L — SIGNIFICANT CHANGE UP (ref 22–31)
CO2 SERPL-SCNC: 23 MMOL/L — SIGNIFICANT CHANGE UP (ref 22–31)
CREAT SERPL-MCNC: 0.8 MG/DL — SIGNIFICANT CHANGE UP (ref 0.5–1.3)
CREAT SERPL-MCNC: 0.97 MG/DL — SIGNIFICANT CHANGE UP (ref 0.5–1.3)
GLUCOSE BLDC GLUCOMTR-MCNC: 88 MG/DL — SIGNIFICANT CHANGE UP (ref 70–99)
GLUCOSE SERPL-MCNC: 96 MG/DL — SIGNIFICANT CHANGE UP (ref 70–99)
GLUCOSE SERPL-MCNC: 98 MG/DL — SIGNIFICANT CHANGE UP (ref 70–99)
HCT VFR BLD CALC: 37.5 % — LOW (ref 39–50)
HCT VFR BLD CALC: 40.1 % — SIGNIFICANT CHANGE UP (ref 39–50)
HGB BLD-MCNC: 12.6 G/DL — LOW (ref 13–17)
HGB BLD-MCNC: 13.3 G/DL — SIGNIFICANT CHANGE UP (ref 13–17)
MAGNESIUM SERPL-MCNC: 2 MG/DL — SIGNIFICANT CHANGE UP (ref 1.6–2.6)
MAGNESIUM SERPL-MCNC: 2 MG/DL — SIGNIFICANT CHANGE UP (ref 1.6–2.6)
MCHC RBC-ENTMCNC: 27.8 PG — SIGNIFICANT CHANGE UP (ref 27–34)
MCHC RBC-ENTMCNC: 28.4 PG — SIGNIFICANT CHANGE UP (ref 27–34)
MCHC RBC-ENTMCNC: 33.2 GM/DL — SIGNIFICANT CHANGE UP (ref 32–36)
MCHC RBC-ENTMCNC: 33.6 GM/DL — SIGNIFICANT CHANGE UP (ref 32–36)
MCV RBC AUTO: 83.7 FL — SIGNIFICANT CHANGE UP (ref 80–100)
MCV RBC AUTO: 84.7 FL — SIGNIFICANT CHANGE UP (ref 80–100)
NRBC # BLD: 0 /100 WBCS — SIGNIFICANT CHANGE UP
NRBC # BLD: 0 /100 WBCS — SIGNIFICANT CHANGE UP (ref 0–0)
NRBC # FLD: 0 K/UL — SIGNIFICANT CHANGE UP
PHOSPHATE SERPL-MCNC: 2.3 MG/DL — LOW (ref 2.5–4.5)
PHOSPHATE SERPL-MCNC: 2.8 MG/DL — SIGNIFICANT CHANGE UP (ref 2.5–4.5)
PLATELET # BLD AUTO: 200 K/UL — SIGNIFICANT CHANGE UP (ref 150–400)
PLATELET # BLD AUTO: 229 K/UL — SIGNIFICANT CHANGE UP (ref 150–400)
POTASSIUM SERPL-MCNC: 4.2 MMOL/L — SIGNIFICANT CHANGE UP (ref 3.5–5.3)
POTASSIUM SERPL-MCNC: 4.6 MMOL/L — SIGNIFICANT CHANGE UP (ref 3.5–5.3)
POTASSIUM SERPL-SCNC: 4.2 MMOL/L — SIGNIFICANT CHANGE UP (ref 3.5–5.3)
POTASSIUM SERPL-SCNC: 4.6 MMOL/L — SIGNIFICANT CHANGE UP (ref 3.5–5.3)
RBC # BLD: 4.43 M/UL — SIGNIFICANT CHANGE UP (ref 4.2–5.8)
RBC # BLD: 4.79 M/UL — SIGNIFICANT CHANGE UP (ref 4.2–5.8)
RBC # FLD: 13.4 % — SIGNIFICANT CHANGE UP (ref 10.3–14.5)
RBC # FLD: 13.6 % — SIGNIFICANT CHANGE UP (ref 10.3–14.5)
SODIUM SERPL-SCNC: 137 MMOL/L — SIGNIFICANT CHANGE UP (ref 135–145)
SODIUM SERPL-SCNC: 137 MMOL/L — SIGNIFICANT CHANGE UP (ref 135–145)
WBC # BLD: 7.42 K/UL — SIGNIFICANT CHANGE UP (ref 3.8–10.5)
WBC # BLD: 7.98 K/UL — SIGNIFICANT CHANGE UP (ref 3.8–10.5)
WBC # FLD AUTO: 7.42 K/UL — SIGNIFICANT CHANGE UP (ref 3.8–10.5)
WBC # FLD AUTO: 7.98 K/UL — SIGNIFICANT CHANGE UP (ref 3.8–10.5)

## 2021-09-03 PROCEDURE — ZZZZZ: CPT

## 2021-09-03 PROCEDURE — 70450 CT HEAD/BRAIN W/O DYE: CPT | Mod: 26,MA

## 2021-09-03 PROCEDURE — 36223 PLACE CATH CAROTID/INOM ART: CPT | Mod: 50,78

## 2021-09-03 PROCEDURE — 36227 PLACE CATH XTRNL CAROTID: CPT | Mod: LT

## 2021-09-03 PROCEDURE — 75894 X-RAYS TRANSCATH THERAPY: CPT | Mod: 26

## 2021-09-03 PROCEDURE — 99233 SBSQ HOSP IP/OBS HIGH 50: CPT

## 2021-09-03 PROCEDURE — 70551 MRI BRAIN STEM W/O DYE: CPT | Mod: 26

## 2021-09-03 PROCEDURE — 70450 CT HEAD/BRAIN W/O DYE: CPT | Mod: 26,77

## 2021-09-03 PROCEDURE — 61624 TCAT PERM OCCLS/EMBOLJ CNS: CPT | Mod: 78

## 2021-09-03 PROCEDURE — 75898 FOLLOW-UP ANGIOGRAPHY: CPT | Mod: 26

## 2021-09-03 RX ORDER — ACETAMINOPHEN 500 MG
650 TABLET ORAL EVERY 6 HOURS
Refills: 0 | Status: DISCONTINUED | OUTPATIENT
Start: 2021-09-03 | End: 2021-09-10

## 2021-09-03 RX ORDER — LEVETIRACETAM 250 MG/1
5 TABLET, FILM COATED ORAL
Qty: 0 | Refills: 0 | DISCHARGE
Start: 2021-09-03

## 2021-09-03 RX ORDER — POLYETHYLENE GLYCOL 3350 17 G/17G
17 POWDER, FOR SOLUTION ORAL
Qty: 0 | Refills: 0 | DISCHARGE
Start: 2021-09-03

## 2021-09-03 RX ORDER — SODIUM CHLORIDE 9 MG/ML
1000 INJECTION INTRAMUSCULAR; INTRAVENOUS; SUBCUTANEOUS
Refills: 0 | Status: DISCONTINUED | OUTPATIENT
Start: 2021-09-03 | End: 2021-09-04

## 2021-09-03 RX ORDER — OXYCODONE HYDROCHLORIDE 5 MG/1
1 TABLET ORAL
Qty: 0 | Refills: 0 | DISCHARGE
Start: 2021-09-03

## 2021-09-03 RX ORDER — DEXAMETHASONE 0.5 MG/5ML
8 ELIXIR ORAL ONCE
Refills: 0 | Status: DISCONTINUED | OUTPATIENT
Start: 2021-09-03 | End: 2021-09-03

## 2021-09-03 RX ORDER — OXYCODONE HYDROCHLORIDE 5 MG/1
10 TABLET ORAL EVERY 4 HOURS
Refills: 0 | Status: DISCONTINUED | OUTPATIENT
Start: 2021-09-03 | End: 2021-09-03

## 2021-09-03 RX ORDER — OXYCODONE HYDROCHLORIDE 5 MG/1
5 TABLET ORAL EVERY 4 HOURS
Refills: 0 | Status: DISCONTINUED | OUTPATIENT
Start: 2021-09-03 | End: 2021-09-03

## 2021-09-03 RX ORDER — SENNA PLUS 8.6 MG/1
2 TABLET ORAL
Qty: 0 | Refills: 0 | DISCHARGE
Start: 2021-09-03

## 2021-09-03 RX ORDER — LISINOPRIL 2.5 MG/1
10 TABLET ORAL DAILY
Refills: 0 | Status: DISCONTINUED | OUTPATIENT
Start: 2021-09-03 | End: 2021-09-10

## 2021-09-03 RX ORDER — SODIUM CHLORIDE 9 MG/ML
1 INJECTION INTRAMUSCULAR; INTRAVENOUS; SUBCUTANEOUS
Qty: 0 | Refills: 0 | DISCHARGE
Start: 2021-09-03

## 2021-09-03 RX ORDER — HYDROMORPHONE HYDROCHLORIDE 2 MG/ML
0.25 INJECTION INTRAMUSCULAR; INTRAVENOUS; SUBCUTANEOUS
Refills: 0 | Status: DISCONTINUED | OUTPATIENT
Start: 2021-09-03 | End: 2021-09-03

## 2021-09-03 RX ORDER — LABETALOL HCL 100 MG
1 TABLET ORAL
Qty: 0 | Refills: 0 | DISCHARGE
Start: 2021-09-03

## 2021-09-03 RX ORDER — LEVETIRACETAM 250 MG/1
1000 TABLET, FILM COATED ORAL ONCE
Refills: 0 | Status: COMPLETED | OUTPATIENT
Start: 2021-09-03 | End: 2021-09-03

## 2021-09-03 RX ORDER — AMLODIPINE BESYLATE 2.5 MG/1
1 TABLET ORAL
Qty: 0 | Refills: 0 | DISCHARGE
Start: 2021-09-03

## 2021-09-03 RX ORDER — LEVETIRACETAM 250 MG/1
1000 TABLET, FILM COATED ORAL
Refills: 0 | Status: DISCONTINUED | OUTPATIENT
Start: 2021-09-03 | End: 2021-09-04

## 2021-09-03 RX ORDER — ONDANSETRON 8 MG/1
4 TABLET, FILM COATED ORAL ONCE
Refills: 0 | Status: DISCONTINUED | OUTPATIENT
Start: 2021-09-03 | End: 2021-09-03

## 2021-09-03 RX ORDER — ASPIRIN/CALCIUM CARB/MAGNESIUM 324 MG
1 TABLET ORAL
Qty: 0 | Refills: 0 | DISCHARGE

## 2021-09-03 RX ORDER — LABETALOL HCL 100 MG
10 TABLET ORAL ONCE
Refills: 0 | Status: COMPLETED | OUTPATIENT
Start: 2021-09-03 | End: 2021-09-03

## 2021-09-03 RX ORDER — LEVETIRACETAM 250 MG/1
1000 TABLET, FILM COATED ORAL ONCE
Refills: 0 | Status: DISCONTINUED | OUTPATIENT
Start: 2021-09-03 | End: 2021-09-03

## 2021-09-03 RX ORDER — ACETAMINOPHEN 500 MG
1 TABLET ORAL
Qty: 0 | Refills: 0 | DISCHARGE
Start: 2021-09-03

## 2021-09-03 RX ADMIN — Medication 10 MILLIGRAM(S): at 17:56

## 2021-09-03 RX ADMIN — LEVETIRACETAM 400 MILLIGRAM(S): 250 TABLET, FILM COATED ORAL at 17:10

## 2021-09-03 RX ADMIN — SODIUM CHLORIDE 1 GRAM(S): 9 INJECTION INTRAMUSCULAR; INTRAVENOUS; SUBCUTANEOUS at 05:02

## 2021-09-03 RX ADMIN — LEVETIRACETAM 1000 MILLIGRAM(S): 250 TABLET, FILM COATED ORAL at 23:59

## 2021-09-03 RX ADMIN — LEVETIRACETAM 400 MILLIGRAM(S): 250 TABLET, FILM COATED ORAL at 05:02

## 2021-09-03 RX ADMIN — LISINOPRIL 10 MILLIGRAM(S): 2.5 TABLET ORAL at 23:59

## 2021-09-03 RX ADMIN — LISINOPRIL 5 MILLIGRAM(S): 2.5 TABLET ORAL at 05:02

## 2021-09-03 RX ADMIN — AMLODIPINE BESYLATE 5 MILLIGRAM(S): 2.5 TABLET ORAL at 05:02

## 2021-09-03 NOTE — PROGRESS NOTE ADULT - ATTENDING COMMENTS
1. Subdural hematoma. Clinically stable. Pt alert and oriented x2. Non focal neuro exam.  2. UTI: Continue ceftriaxone. Await urine culture.  3. HTN : Continue Lisinopril, Amlodipine.
I agree with the above detailed interval history, physical, and plan, which I have reviewed and edited where appropriate; also agree with notes/assessment and of the team on service.  I have personally examined the patient, reviewed all data.  The plan is specified below:  The patient is in SICU with diagnosis mentioned in the note.    The SICU team has a constant risk benefit analyzes discussion and coordinating care with the primary team, all consultants, House Staff and PA's.  I was physically present for the key portions of the evaluation and management (E/M) service provided.  86 y/o M with SDH sp drainge.  GCS: 15  Exam: Normal, NAD, alert, oriented x 1, no focal deficits. PERRLA, CN II-XII intact, 5/5 motor b/l UE and LE, sensation intact UE and LE, EVD in place subdural on L side  Respiratory  Exam: Lungs clear   Cardiovascular  Exam:   Regular rate and rhythm.    GI  Exam: Abdomen soft, Non-tender, Non-distended.  PLAN:   Neurologic:   - ANO x1, resting comfortably  - q1 neurovascular checks   - keppra BID    Respiratory:    2Lt NC  Cardiovascular:   -SBP goal 110-160  Gastrointestinal/Nutrition:   -NPO  Renal/Genitourinary:   - NS at 75 cc/hr  Hematologic:   --chemical DVT   Infectious Disease:   - ancef   Endocrine:   - monitor sugar    Disposition:   SICU
86 y/o M with hx of HTN, presents with subdural hematoma with subfalcine herniation and 0.7 cm rightward midline shift seen on outpatient CT head.     Patient AAOx2 during rounds. Focal deficets on the left extremities more on the left lower extremities. No other obvious neurological abnormalities. Continue with Neuro check Q1 hour. Follow up recommendations with neurosurgery regarding need for [facility] transfer for EMBOLIZE trial. No obvious signs of seizure activity so no need for vEEG at this time. If signs of seizure activity in the MICU will order vEEG. Patient with Type II MI and cardiology already consulted in ER. TTE (no obvious abnormalities on ultrasound). Follow up EKG and troponins. ASA on hold due to SDH.    Rest of care as per above.  Prognosis is guarded.
I agree with the above detailed interval history, physical, and plan, which I have reviewed and edited where appropriate; also agree with notes/assessment and of the team on service.  I have personally examined the patient, reviewed all data.  The plan is specified below:  The patient is in SICU with diagnosis mentioned in the note.    The SICU team has a constant risk benefit analyzes discussion and coordinating care with the primary team, all consultants, House Staff and PA's.  I was physically present for the key portions of the evaluation and management (E/M) service provided.  87y Male s/p L craniotomy with drain placement for subdural hematoma in SICU.  EVD in place subdural on L side.  CV: Regular rate and rhythm,   Resp: clear  GI: Abdomen soft, non-distended, non tender to palpation. No masses appreciated. Bowel sounds present.  MSK: Extremities warm,     Neurologic:   -- q1 neurovascular checks   -- Keppra BID  - being transferred to [facility] for embolization trial  Respiratory:   - 2L NC  -Cardiovascular:   -- no pressor requirement at this time  Gastrointestinal/Nutrition:   - NPO  Renal/Genitourinary:   - NS at 75 cc/hr  - Hematologic:   - H/H stable  -   Infectious Disease:   - Ancef   Endocrine:   - monitor blood glucose    Disposition:   - SICU
I agree with the detailed interval history, physical, and plan, which I have reviewed and edited where appropriate'; also agree with notes/assessment with my team on service.  I have personally examined the patient.  I was physically present for the key portions of the evaluation and management (E/M) service provided.  I reviewed all the pertinent data.  The patient is a critical care patient with life threatening hemodynamic and metabolic instability in SICU.  The SICU team has a constant risk benefit analyzes discussion and coordinating care with the primary team and all consultants.   The patient is in SICU with diagnosis mentioned in the note.   The plan will be specified in the note.  87y Male s/p L craniotomy with drain placement for subdural hematoma  Normal, NAD, alert, oriented x 1, no focal deficits.   Respiratory  Exam: Lungs clear   Cardiovascular  Exam: Regular rate and rhythm.    GI  Exam: Abdomen soft, Non-tender, Non-distended.      PLAN:   Neurologic:   - q1 neurovascular checks   - keppra BID  Respiratory:   -2: NC  Cardiovascular:   -SBP goal 110-160  Gastrointestinal/Nutrition:   -NPO   Renal/Genitourinary:   - NS at 75 cc/hr  Hematologic:   - no chemical DVT   Infectious Disease:   - ancef perioperatively  Endocrine:   - monitor glucose    Disposition:   SICU
Labs and imagining reviewed.  87M with HTN presented with fall and traumatic SDH, plan for jewel hole today. on exam, no deficits. patient awake and alert. c/w keppra for siezure ppx. f/u neurosurgery.

## 2021-09-03 NOTE — PROGRESS NOTE ADULT - PROVIDER SPECIALTY LIST ADULT
Anesthesia
Neurosurgery
Cardiology
MICU
Neurosurgery
SICU
MICU
SICU
SICU
Neurosurgery
Hospitalist
Internal Medicine
Internal Medicine

## 2021-09-03 NOTE — PROGRESS NOTE ADULT - ASSESSMENT
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  9/3: Stable exam POD #3 S/P left sided jewel hole for evacuation of chronic SDH, subdural RAY in place
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
## 2021-09-03 NOTE — PROGRESS NOTE ADULT - ASSESSMENT
87y Male s/p L craniotomy with drain placement for subdural hematoma.    Interval/Overnight Events:  - No acute events overnight     Neurologic:   - A&O x1, resting comfortably  - q1 neurovascular checks   - CTH in AM for follow up  - pain control  - Keppra BID  - being transferred to [facility] for embolization trial    Respiratory:   - saturating well on 2L NC  - monitor SpO2    Cardiovascular:   - SBP goal 110-160  - no pressor requirement at this time    Gastrointestinal/Nutrition:   - NPO    Renal/Genitourinary:   - NS at 75 cc/hr  - monitor electrolytes  - Na 135-145, salt tabs started    Hematologic:   - H/H stable  - monitor CBC  - no chemical DVT or AC ppx    Infectious Disease:   - Ancef perioperatively  - monitor WBC and fever curve    Lines/Tubes:   -EVD    Endocrine:   - monitor blood glucose    Disposition:   - SICU 87y Male s/p L craniotomy with drain placement for subdural hematoma.    Interval/Overnight Events:  - No acute events overnight     Neurologic:   - A&O x1, resting comfortably  - q1 neurovascular checks   - CTH in AM for follow up  - pain control  - Keppra BID  - being transferred to [facility] for embolization trial    Respiratory:   - saturating well on 2L NC  - monitor SpO2    Cardiovascular:   - SBP goal 110-160  - no pressor requirement at this time    Gastrointestinal/Nutrition:   - NPO    Renal/Genitourinary:   - NS at 75 cc/hr  - monitor electrolytes  - Na 135-145, salt tabs started    Hematologic:   - H/H stable  - monitor CBC  - no chemical DVT or AC ppx    Infectious Disease:   - Ancef perioperatively  - monitor WBC and fever curve    Lines/Tubes:   -EVD    Endocrine:   - monitor blood glucose    Disposition:   - SICU 87y Male s/p L craniotomy with drain placement for subdural hematoma.    Interval/Overnight Events:  - No acute events overnight  - transferred to [facility] today 9/3 for embolization trial, and then returned to [facility] SICU     Neurologic:   - A&O x1, resting comfortably  - q1 neurovascular checks   - CTH in AM for follow up  - pain control  - Keppra BID  - being transferred to [facility] for embolization trial    Respiratory:   - saturating well on 2L NC  - monitor SpO2    Cardiovascular:   - SBP goal 110-160  - no pressor requirement at this time    Gastrointestinal/Nutrition:   - NPO    Renal/Genitourinary:   - NS at 75 cc/hr  - monitor electrolytes  - Na 135-145, salt tabs started    Hematologic:   - H/H stable  - monitor CBC  - no chemical DVT or AC ppx    Infectious Disease:   - Ancef perioperatively  - monitor WBC and fever curve    Lines/Tubes:   -EVD    Endocrine:   - monitor blood glucose    Disposition:   - transfer to [facility], then return to [facility] SICU

## 2021-09-03 NOTE — PROGRESS NOTE ADULT - NUTRITIONAL ASSESSMENT
This patient has been assessed with a concern for Malnutrition and has been determined to have a diagnosis/diagnoses of Severe protein-calorie malnutrition.    This patient is being managed with:   Diet NPO after Midnight-     NPO Start Date: 02-Sep-2021   NPO Start Time: 23:59  Entered: Sep  2 2021  6:10PM    Diet Regular-  Entered: Sep  2 2021 11:08AM    
This patient has been assessed with a concern for Malnutrition and has been determined to have a diagnosis/diagnoses of Severe protein-calorie malnutrition.    This patient is being managed with:   Diet NPO after Midnight-     NPO Start Date: 30-Aug-2021   NPO Start Time: 23:59  Except Medications  With Ice Chips/Sips of Water  Entered: Aug 30 2021  5:28PM    Diet Dysphagia 2 Mechanical Soft-Thin Liquids-  DASH/TLC {Sodium & Cholesterol Restricted} (DASH)  Supplement Feeding Modality:  Oral  Glucerna Shake Cans or Servings Per Day:  1       Frequency:  Two Times a day  Entered: Aug 30 2021  2:57PM    
This patient has been assessed with a concern for Malnutrition and has been determined to have a diagnosis/diagnoses of Severe protein-calorie malnutrition.    This patient is being managed with:   Diet NPO after Midnight-     NPO Start Date: 30-Aug-2021   NPO Start Time: 23:59  Except Medications  With Ice Chips/Sips of Water  Entered: Aug 30 2021  5:28PM    Diet Dysphagia 2 Mechanical Soft-Thin Liquids-  DASH/TLC {Sodium & Cholesterol Restricted} (DASH)  Supplement Feeding Modality:  Oral  Glucerna Shake Cans or Servings Per Day:  1       Frequency:  Two Times a day  Entered: Aug 30 2021  2:57PM    
This patient has been assessed with a concern for Malnutrition and has been determined to have a diagnosis/diagnoses of Severe protein-calorie malnutrition.    This patient is being managed with:   Diet Mechanical Soft-  Entered: Sep  1 2021  8:30AM    
This patient has been assessed with a concern for Malnutrition and has been determined to have a diagnosis/diagnoses of Severe protein-calorie malnutrition.    This patient is being managed with:   Diet NPO after Midnight-     NPO Start Date: 02-Sep-2021   NPO Start Time: 23:59  Entered: Sep  2 2021  6:10PM    Diet Regular-  Entered: Sep  2 2021 11:08AM    
This patient has been assessed with a concern for Malnutrition and has been determined to have a diagnosis/diagnoses of Severe protein-calorie malnutrition.    This patient is being managed with:   Diet NPO after Midnight-     NPO Start Date: 30-Aug-2021   NPO Start Time: 23:59  Except Medications  With Ice Chips/Sips of Water  Entered: Aug 30 2021  5:28PM    Diet Dysphagia 2 Mechanical Soft-Thin Liquids-  DASH/TLC {Sodium & Cholesterol Restricted} (DASH)  Supplement Feeding Modality:  Oral  Glucerna Shake Cans or Servings Per Day:  1       Frequency:  Two Times a day  Entered: Aug 30 2021  2:57PM    

## 2021-09-03 NOTE — PROGRESS NOTE ADULT - SUBJECTIVE AND OBJECTIVE BOX
No issues overnight  ICU Vital Signs Last 24 Hrs  T(C): 36.2 (02 Sep 2021 20:00), Max: 36.7 (02 Sep 2021 16:00)  T(F): 97.2 (02 Sep 2021 20:00), Max: 98 (02 Sep 2021 16:00)  HR: 99 (03 Sep 2021 02:00) (67 - 99)  BP: 156/73 (02 Sep 2021 23:00) (125/62 - 157/92)  BP(mean): 95 (02 Sep 2021 23:00) (76 - 109)  ABP: --  ABP(mean): --  RR: 22 (03 Sep 2021 02:00) (12 - 22)  SpO2: 98% (03 Sep 2021 02:00) (92% - 100%)    AAO X 3  PERRLA, EOMI  CN 2-12 grossly intact  ROBERTSON strength 5/5, no drift  SILT    Subdural RAY: No output    MEDICATIONS  (STANDING):  amLODIPine   Tablet 5 milliGRAM(s) Oral daily  heparin   Injectable 5000 Unit(s) SubCutaneous every 12 hours  levETIRAcetam  IVPB 500 milliGRAM(s) IV Intermittent every 12 hours  lisinopril 5 milliGRAM(s) Oral daily  polyethylene glycol 3350 17 Gram(s) Oral daily  senna 2 Tablet(s) Oral at bedtime  sodium chloride   Oral Tab/Cap - Peds 1 Gram(s) Oral three times a day    MEDICATIONS  (PRN):  acetaminophen   Tablet .. 500 milliGRAM(s) Oral every 6 hours PRN Mild Pain (1 - 3)  labetalol Injectable 5 milliGRAM(s) IV Push every 6 hours PRN Systolic blood pressure >160  oxyCODONE    IR 5 milliGRAM(s) Oral every 4 hours PRN Moderate Pain (4 - 6)                          12.6   7.98  )-----------( 200      ( 03 Sep 2021 02:59 )             37.5     09-03    137  |  103  |  17  ----------------------------<  96  4.6   |  23  |  0.97    Ca    8.7      03 Sep 2021 02:59  Phos  2.3     09-03  Mg     2.00     09-03

## 2021-09-03 NOTE — PROGRESS NOTE ADULT - PROBLEM SELECTOR PLAN 2
Neurologic checks Q2H  Monitor drain output  Possible transfer to [facility] for EMBOLIZE trial
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
## 2021-09-03 NOTE — PROGRESS NOTE ADULT - REASON FOR ADMISSION
subdural hematoma

## 2021-09-03 NOTE — PRE-ANESTHESIA EVALUATION ADULT - NSANTHPMHFT_GEN_ALL_CORE
Subdural hematoma with subfalcine herniation s/p RAY drainage and jewel holes  Syncope  UTI  QT prolongation  CKD    PSH: [place] holes, Inguinal hernia repair
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
## 2021-09-03 NOTE — PROGRESS NOTE ADULT - PROBLEM SELECTOR PROBLEM 1
Hypertension
Hypertension
Subdural hematoma
Hypertension
Hypertension
Subdural hematoma
Subdural hematoma

## 2021-09-03 NOTE — CHART NOTE - NSCHARTNOTEFT_GEN_A_CORE
Got paged by PACU RN regarding exam changes post angiogram. Patient was seen at bedside with JIM Ferrell. Patient denied any headache, cp, sob, n/v, or abd pain.    Vital Signs Last 24 Hrs  T(C): 36.4 (03 Sep 2021 18:00), Max: 36.8 (03 Sep 2021 12:34)  T(F): 97.5 (03 Sep 2021 18:00), Max: 98.2 (03 Sep 2021 12:34)  HR: 83 (03 Sep 2021 19:00) (76 - 107)  BP: 150/79 (03 Sep 2021 19:00) (122/69 - 172/96)  BP(mean): 90 (03 Sep 2021 18:30) (76 - 129)  RR: 17 (03 Sep 2021 19:00) (10 - 22)  SpO2: 97% (03 Sep 2021 19:00) (92% - 100%)    I&O's Detail    03 Sep 2021 07:01  -  03 Sep 2021 19:42  --------------------------------------------------------  IN:    IV PiggyBack: 100 mL  Total IN: 100 mL    OUT:    Voided (mL): 300 mL  Total OUT: 300 mL    Total NET: -200 mL        I&O's Summary    03 Sep 2021 07:01  -  03 Sep 2021 19:42  --------------------------------------------------------  IN: 100 mL / OUT: 300 mL / NET: -200 mL        PHYSICAL EXAM:  Neurological:  awake, alert, oriented to self and place with choices, WFDs, no facial asymmetry noted, following simple commands, ROBERTSON at least 4/5, sensation intact to light touch  Cardiovascular: +s1, s2  Respiratory: clear to auscultation b/l  Gastrointestinal: soft, non-distended, non-tender  Extremities: warm, dry  Incision/Wound: groin incision site C/D/I with safeguard inflated      LABS:                        13.3   7.42  )-----------( 229      ( 03 Sep 2021 16:15 )             40.1     09-03    137  |  103  |  13  ----------------------------<  98  4.2   |  22  |  0.80    Ca    9.2      03 Sep 2021 16:15  Phos  2.8     09-03  Mg     2.0     09-03              CAPILLARY BLOOD GLUCOSE      POCT Blood Glucose.: 88 mg/dL (03 Sep 2021 16:06)      Drug Levels: [] N/A    CSF Analysis: [] N/A      Allergies    No Known Allergies    Intolerances      MEDICATIONS:  Antibiotics:    Neuro:  acetaminophen   Tablet .. 650 milliGRAM(s) Oral every 6 hours PRN  HYDROmorphone  Injectable 0.25 milliGRAM(s) IV Push every 10 minutes PRN  levETIRAcetam  IVPB 1000 milliGRAM(s) IV Intermittent once  ondansetron Injectable 4 milliGRAM(s) IV Push once PRN  oxyCODONE    IR 5 milliGRAM(s) Oral every 4 hours PRN  oxyCODONE    IR 10 milliGRAM(s) Oral every 4 hours PRN    Anticoagulation:    OTHER:  dexAMETHasone  IVPB 8 milliGRAM(s) IV Intermittent once PRN  lisinopril 10 milliGRAM(s) Oral daily    IVF:  sodium chloride 0.9%. 1000 milliLiter(s) IV Continuous <Continuous>    CULTURES:  Culture Results:   >=3 organisms. Probable collection contamination. 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 Sanjeev Recinos for ?seizure, VEEG pending  - MRI brain pending, neurosurgery floor vs ICU pending on MRI results    Above plan discussed with Dr. Pace   pager [phone]

## 2021-09-03 NOTE — CHART NOTE - NSCHARTNOTEFT_GEN_A_CORE
Interventional Neuro- Radiology   Procedure Note      Procedure: Selective Cerebral Angiography   Pre- Procedure Diagnosis:  Post- Procedure Diagnosis:    : Dr. Sanjeev MD    Physician Assistant: Mariaelena Franz PA-C    RN:  Tech:    Anesthesia: (MAC)   (general anesthesia)    I/Os:  Fluids:  Guevara:  Contrast:  Estimated Blood Loss: <10cc    Preliminary Report:  Under MAC/ general anesthesia, using a ___Fr short/long sheath to the right/ left/ bilateral groin/right radial artery examination of left vertebral artery/ left internal carotid artery/ left external carotid artey/ right vertebral artery/ right internal carotid artery/ right external carotid artery via selective cerebral angiography demonstrates ________. (Official note to follow).    Patient tolerated procedure well, vital signs stable, hemodynamically stable, no change in neurological status compared to baseline. Results discussed with neurosurgery/ patient and their family. Groin sheath d/c'ed, manual compression held to hemostasis, no active bleeding, no hematoma, vascade closure device applied, quick clot and safeguard balloon dressing applied at _____h. Radial sheath d/c'd, TR band applied at ____h with ___cc of air; no bleeding, no hematoma. Patient transferred to PACU/ NSCU/ IR recovery for further care/ monitoring. Interventional Neuro- Radiology   Procedure Note      Procedure: Selective Cerebral Angiography and MMA Embolization  Pre- Procedure Diagnosis: L SDH  Post- Procedure Diagnosis: L MMA embolization using micro-particles and coils    : Dr. Sanjeev MD    Physician Assistant: Mariaelena Franz PA-C    RN: Jessica Mckinnon: Dayday    Anesthesia: Dr. Tomy MD (MAC)    I/Os:  Fluids: 100 cc DTV  Contrast: 67 cc  Estimated Blood Loss: <10cc    Preliminary Report:  Under MAC, using a 5Fr 90 arrowflex sheath to the right groin examination of left internal carotid artery/ left external carotid artery/ right common carotid artery via selective cerebral angiography demonstrates L MMA embolization using micro-particles and coils. (Official note to follow).    Patient tolerated procedure well, vital signs stable, hemodynamically stable, no change in neurological status compared to baseline. Results discussed with neurosurgery/ patient and their family. Groin sheath d/c'ed, manual compression held to hemostasis, no active bleeding, no hematoma, vascade closure device applied, quick clot and safeguard balloon dressing applied at _____h. Patient transferred to PACU for further care/ monitoring.    Mariaelena Franz PA-C

## 2021-09-03 NOTE — PACU DISCHARGE NOTE - COMMENTS
No anesthetic contraindication for transfer of care to SICU [facility]

## 2021-09-04 LAB
APPEARANCE UR: ABNORMAL
BACTERIA # UR AUTO: NEGATIVE — SIGNIFICANT CHANGE UP
BILIRUB UR-MCNC: NEGATIVE — SIGNIFICANT CHANGE UP
COLOR SPEC: SIGNIFICANT CHANGE UP
DIFF PNL FLD: ABNORMAL
EPI CELLS # UR: 0 /HPF — SIGNIFICANT CHANGE UP
GLUCOSE UR QL: NEGATIVE — SIGNIFICANT CHANGE UP
HYALINE CASTS # UR AUTO: 1 /LPF — SIGNIFICANT CHANGE UP (ref 0–2)
KETONES UR-MCNC: NEGATIVE — SIGNIFICANT CHANGE UP
LACTATE SERPL-SCNC: 1.5 MMOL/L — SIGNIFICANT CHANGE UP (ref 0.7–2)
LEUKOCYTE ESTERASE UR-ACNC: ABNORMAL
NITRITE UR-MCNC: POSITIVE
PH UR: 6.5 — SIGNIFICANT CHANGE UP (ref 5–8)
PROCALCITONIN SERPL-MCNC: 0.18 NG/ML — HIGH (ref 0.02–0.1)
PROT UR-MCNC: ABNORMAL
RBC CASTS # UR COMP ASSIST: 5 /HPF — HIGH (ref 0–4)
SP GR SPEC: 1.02 — SIGNIFICANT CHANGE UP (ref 1.01–1.02)
UROBILINOGEN FLD QL: NEGATIVE — SIGNIFICANT CHANGE UP
WBC UR QL: 374 /HPF — HIGH (ref 0–5)

## 2021-09-04 PROCEDURE — 70450 CT HEAD/BRAIN W/O DYE: CPT | Mod: 26

## 2021-09-04 PROCEDURE — 99232 SBSQ HOSP IP/OBS MODERATE 35: CPT

## 2021-09-04 PROCEDURE — 95720 EEG PHY/QHP EA INCR W/VEEG: CPT

## 2021-09-04 RX ORDER — SENNA PLUS 8.6 MG/1
2 TABLET ORAL AT BEDTIME
Refills: 0 | Status: DISCONTINUED | OUTPATIENT
Start: 2021-09-04 | End: 2021-09-10

## 2021-09-04 RX ORDER — ENOXAPARIN SODIUM 100 MG/ML
40 INJECTION SUBCUTANEOUS
Refills: 0 | Status: DISCONTINUED | OUTPATIENT
Start: 2021-09-04 | End: 2021-09-10

## 2021-09-04 RX ORDER — CEFTRIAXONE 500 MG/1
INJECTION, POWDER, FOR SOLUTION INTRAMUSCULAR; INTRAVENOUS
Refills: 0 | Status: DISCONTINUED | OUTPATIENT
Start: 2021-09-04 | End: 2021-09-05

## 2021-09-04 RX ORDER — CEFTRIAXONE 500 MG/1
1000 INJECTION, POWDER, FOR SOLUTION INTRAMUSCULAR; INTRAVENOUS EVERY 24 HOURS
Refills: 0 | Status: DISCONTINUED | OUTPATIENT
Start: 2021-09-05 | End: 2021-09-05

## 2021-09-04 RX ORDER — LEVETIRACETAM 250 MG/1
750 TABLET, FILM COATED ORAL
Refills: 0 | Status: DISCONTINUED | OUTPATIENT
Start: 2021-09-04 | End: 2021-09-10

## 2021-09-04 RX ORDER — CEFTRIAXONE 500 MG/1
1000 INJECTION, POWDER, FOR SOLUTION INTRAMUSCULAR; INTRAVENOUS ONCE
Refills: 0 | Status: COMPLETED | OUTPATIENT
Start: 2021-09-04 | End: 2021-09-04

## 2021-09-04 RX ORDER — POLYETHYLENE GLYCOL 3350 17 G/17G
17 POWDER, FOR SOLUTION ORAL
Refills: 0 | Status: DISCONTINUED | OUTPATIENT
Start: 2021-09-04 | End: 2021-09-10

## 2021-09-04 RX ADMIN — SENNA PLUS 2 TABLET(S): 8.6 TABLET ORAL at 22:15

## 2021-09-04 RX ADMIN — LEVETIRACETAM 750 MILLIGRAM(S): 250 TABLET, FILM COATED ORAL at 18:36

## 2021-09-04 RX ADMIN — Medication 650 MILLIGRAM(S): at 05:00

## 2021-09-04 RX ADMIN — Medication 650 MILLIGRAM(S): at 04:30

## 2021-09-04 RX ADMIN — CEFTRIAXONE 100 MILLIGRAM(S): 500 INJECTION, POWDER, FOR SOLUTION INTRAMUSCULAR; INTRAVENOUS at 02:28

## 2021-09-04 RX ADMIN — LISINOPRIL 10 MILLIGRAM(S): 2.5 TABLET ORAL at 05:47

## 2021-09-04 RX ADMIN — LEVETIRACETAM 1000 MILLIGRAM(S): 250 TABLET, FILM COATED ORAL at 05:47

## 2021-09-04 RX ADMIN — ENOXAPARIN SODIUM 40 MILLIGRAM(S): 100 INJECTION SUBCUTANEOUS at 18:36
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
## 2021-09-04 NOTE — CHART NOTE - NSCHARTNOTESELECT_GEN_ALL_CORE
Event Note
Interventional Neuro Radiology/Event Note
Interventional Neuro Radiology/Event Note
vte risk assessment/Event Note

## 2021-09-04 NOTE — OCCUPATIONAL THERAPY INITIAL EVALUATION ADULT - NS ASR FOLLOW COMMAND OT EVAL
with visual cuing.  Pt able to answer simple yes/no questions inconsistently.  Pt minimally verbal, repeating phrases "okay, alright" and some non-sensical speech./50% of the time/able to follow single-step instructions
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
## 2021-09-04 NOTE — EEG REPORT - NS EEG TEXT BOX
[facility]   COMPREHENSIVE EPILEPSY CENTER   REPORT OF LONG-TERM VIDEO EEG     [facility]: [address], [place] [address], Ph#: [phone]  [facility]: [address] [place] [address], Ph#: [phone]  [facility]: [address] [place] [address], Ph#: [phone]    Patient Name: JOEL BARBER  Age and : 87y (34)  MRN #: [id number]  Location: [place] [address]  Referring Physician: Ilia Pace    Start Time/Date: 01:16 on 21  End Time/Date: 08:00 on 21  Duration: 05 hours 11 mins  _____________________________________________________________  STUDY INFORMATION    EEG Recording Technique:  The patient underwent continuous Video-EEG monitoring, using Telemetry System hardware on the XLTek Digital System. EEG and video data were stored on a computer hard drive with important events saved in digital archive files. The material was reviewed by a physician (electroencephalographer / epileptologist) on a daily basis. Federico and seizure detection algorithms were utilized and reviewed. An EEG Technician attended to the patient, and was available throughout daytime work hours.  The epilepsy center neurologist was available in person or on call 24-hours per day.    EEG Placement and Labeling of Electrodes:  The EEG was performed utilizing 20 channel referential EEG connections (coronal over temporal over parasagittal montage) using all standard 10-20 electrode placements with EKG, with additional electrodes placed in the inferior temporal region using the modified 10-10 montage electrode placements for elective admissions, or if deemed necessary. Recording was at a sampling rate of 256 samples per second per channel. Time synchronized digital video recording was done simultaneously with EEG recording. A low light infrared camera was used for low light recording.     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    PERTINENT MEDICATION:    levETIRAcetam 750 milliGRAM(s) Oral two times a day    _____________________________________________________________  STUDY INTERPRETATION    Findings: The background was continuous and reactive. During wakefulness, the posterior dominant rhythm consisted of symmetric, poorly-modulated 7.5 Hz activity, better seen over the right.    Background Slowing:  Diffuse theta and delta slowing.    Focal Slowing:   Continuous theta polymorphic delta slowing in the left hemisphere, more prominent over left frontal region.    Sleep Background:  Drowsiness and stage II sleep transients were not recorded.    Other Non-Epileptiform Findings:  None were present.    Interictal Epileptiform Activity:   None were present.    Events:  Clinical events: None recorded.  Seizures: None recorded.    Activation Procedures:   Hyperventilation was not performed.    Photic stimulation was performed and did not elicit any abnormality.     Artifacts:  Intermittent myogenic and movement artifacts were noted.    ECG:  The heart rate on single channel ECG was predominantly between  BPM.    _____________________________________________________________  EEG SUMMARY/CLASSIFICATION    Abnormal EEG in an encephalopathic patient:    - Continuous theta polymorphic delta slowing in the left hemisphere, more prominent over left frontal region.  - Mild-moderate generalized slowing including PDR  < 8Hz.    _____________________________________________________________  EEG IMPRESSION/CLINICAL CORRELATE    Abnormal EEG study.    - Structural abnormality in the left hemisphere.  - Mild-moderate nonspecific diffuse or multifocal cerebral dysfunction.   - No epileptiform pattern or seizure seen.      *** PRELIMINARY REPORT - PENDING EPILEPSY ATTENDING REVIEW ***  _____________________________________________________________    Tyrone Reid MD, TARYN  Fellow, [facility]     [facility]   COMPREHENSIVE EPILEPSY CENTER   REPORT OF LONG-TERM VIDEO EEG     [facility]: [address], [place] [address], Ph#: [phone]  [facility]: [address] [place] [address], Ph#: [phone]  [facility]: [address] [place] [address], Ph#: [phone]    Patient Name: JOEL BARBER  Age and : 87y (34)  MRN #: [id number]  Location: [place] [address]  Referring Physician: Ilia Pace    Start Time/Date: 01:16 on 21  End Time/Date: 08:00 on 21  Duration: 05 hours 11 mins  _____________________________________________________________  STUDY INFORMATION    EEG Recording Technique:  The patient underwent continuous Video-EEG monitoring, using Telemetry System hardware on the XLTek Digital System. EEG and video data were stored on a computer hard drive with important events saved in digital archive files. The material was reviewed by a physician (electroencephalographer / epileptologist) on a daily basis. Federico and seizure detection algorithms were utilized and reviewed. An EEG Technician attended to the patient, and was available throughout daytime work hours.  The epilepsy center neurologist was available in person or on call 24-hours per day.    EEG Placement and Labeling of Electrodes:  The EEG was performed utilizing 20 channel referential EEG connections (coronal over temporal over parasagittal montage) using all standard 10-20 electrode placements with EKG, with additional electrodes placed in the inferior temporal region using the modified 10-10 montage electrode placements for elective admissions, or if deemed necessary. Recording was at a sampling rate of 256 samples per second per channel. Time synchronized digital video recording was done simultaneously with EEG recording. A low light infrared camera was used for low light recording.     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    PERTINENT MEDICATION:    levETIRAcetam 750 milliGRAM(s) Oral two times a day    _____________________________________________________________  STUDY INTERPRETATION    Findings: The background was continuous and reactive. During wakefulness, the posterior dominant rhythm consisted of symmetric, poorly-modulated 7.5 Hz activity, better seen over the right.    Background Slowing:  Diffuse theta and delta slowing.    Focal Slowing:   Continuous theta polymorphic delta slowing in the left hemisphere, more prominent over left frontal region.    Sleep Background:  Drowsiness and stage II sleep transients were not recorded.    Other Non-Epileptiform Findings:  None were present.    Interictal Epileptiform Activity:   None were present.    Events:  Clinical events: None recorded.  Seizures: None recorded.    Activation Procedures:   Hyperventilation was not performed.    Photic stimulation was performed and did not elicit any abnormality.     Artifacts:  Intermittent myogenic and movement artifacts were noted.    ECG:  The heart rate on single channel ECG was predominantly between  BPM.    _____________________________________________________________  EEG SUMMARY/CLASSIFICATION    Abnormal EEG in an encephalopathic patient:    - Continuous theta polymorphic delta slowing in the left hemisphere, more prominent over left frontal region.  - Mild-moderate generalized slowing including PDR  < 8Hz.    _____________________________________________________________  EEG IMPRESSION/CLINICAL CORRELATE    Abnormal EEG study.    - Structural abnormality in the left hemisphere.  - Mild-moderate nonspecific diffuse or multifocal cerebral dysfunction.   - No epileptiform pattern or seizure seen.    d/w MONISHA Sal    *** PRELIMINARY REPORT - PENDING EPILEPSY ATTENDING REVIEW ***  _____________________________________________________________    Tyrone Reid MD, TARYN  Fellow, [facility] [place] [employer]     [facility]   COMPREHENSIVE EPILEPSY CENTER   REPORT OF LONG-TERM VIDEO EEG     [facility]: [address], [place] [address], Ph#: [phone]  [facility]: [address] [place] [address], Ph#: [phone]  [facility]: [address] [place] [address], Ph#: [phone]    Patient Name: JOEL BARBER  Age and : 87y (34)  MRN #: [id number]  Location: [place] [address]  Referring Physician: Ilia Pace    Start Time/Date: 01:16 on 21  End Time/Date: 08:00 on 21  Duration: 05 hours 11 mins  _____________________________________________________________  STUDY INFORMATION    EEG Recording Technique:  The patient underwent continuous Video-EEG monitoring, using Telemetry System hardware on the XLTek Digital System. EEG and video data were stored on a computer hard drive with important events saved in digital archive files. The material was reviewed by a physician (electroencephalographer / epileptologist) on a daily basis. Federico and seizure detection algorithms were utilized and reviewed. An EEG Technician attended to the patient, and was available throughout daytime work hours.  The epilepsy center neurologist was available in person or on call 24-hours per day.    EEG Placement and Labeling of Electrodes:  The EEG was performed utilizing 20 channel referential EEG connections (coronal over temporal over parasagittal montage) using all standard 10-20 electrode placements with EKG, with additional electrodes placed in the inferior temporal region using the modified 10-10 montage electrode placements for elective admissions, or if deemed necessary. Recording was at a sampling rate of 256 samples per second per channel. Time synchronized digital video recording was done simultaneously with EEG recording. A low light infrared camera was used for low light recording.     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    PERTINENT MEDICATION:    levETIRAcetam 750 milliGRAM(s) Oral two times a day    _____________________________________________________________  STUDY INTERPRETATION    Findings: The background was continuous and reactive. During wakefulness, the posterior dominant rhythm consisted of fragments of 7 Hz activity.    Background Slowing:  Diffuse theta and delta slowing.    Focal Slowing:   Continuous theta polymorphic delta slowing in the left hemisphere.    Sleep Background:  Drowsiness and stage II sleep transients were not recorded.    Other Non-Epileptiform Findings:  None were present.    Interictal Epileptiform Activity:   None were present.    Events:  Clinical events: None recorded.  Seizures: None recorded.    Activation Procedures:   Hyperventilation was not performed.    Photic stimulation was performed and did not elicit any abnormality.     Artifacts:  Intermittent myogenic and movement artifacts were noted.    ECG:  The heart rate on single channel ECG was predominantly between  BPM.    _____________________________________________________________  EEG SUMMARY/CLASSIFICATION    Abnormal EEG in an encephalopathic patient:  - Continuous theta polymorphic delta slowing in the left hemisphere.  - Mild-moderate generalized slowing including PDR  < 8Hz.    _____________________________________________________________  EEG IMPRESSION/CLINICAL CORRELATE    Abnormal EEG study.  1. Structural abnormality in the left hemisphere.  2. Mild-moderate nonspecific diffuse or multifocal cerebral dysfunction.   3. No epileptiform pattern or seizure seen.    d/w MONISHA Sal    _____________________________________________________________    Tyrone Reid MD, TARYN  Fellow, [facility]

## 2021-09-04 NOTE — CHART NOTE - NSCHARTNOTEFT_GEN_A_CORE
CAPRINI SCORE [CLOT] Score on Admission for     AGE RELATED RISK FACTORS                                                       MOBILITY RELATED FACTORS  [ ] Age 41-60 years                                            (1 Point)                  [ ] Bed rest                                                        (1 Point)  [ ] Age: 61-74 years                                           (2 Points)                 [ ] Plaster cast                                                   (2 Points)  [ x] Age= 75 years                                              (3 Points)                 [ ] Bed bound for more than 72 hours                 (2 Points)    DISEASE RELATED RISK FACTORS                                               GENDER SPECIFIC FACTORS  [ ] Edema in the lower extremities                       (1 Point)                  [ ] Pregnancy                                                     (1 Point)  [ ] Varicose veins                                               (1 Point)                  [ ] Post-partum < 6 weeks                                   (1 Point)             [ ] BMI > 25 Kg/m2                                            (1 Point)                  [ ] Hormonal therapy  or oral contraception          (1 Point)                 [ ] Sepsis (in the previous month)                        (1 Point)                  [ ] History of pregnancy complications                 (1 point)  [ ] Pneumonia or serious lung disease                                               [ ] Unexplained or recurrent                     (1 Point)           (in the previous month)                               (1 Point)  [ ] Abnormal pulmonary function test                     (1 Point)                 SURGERY RELATED RISK FACTORS (include planned surgeries)  [ ] Acute myocardial infarction                              (1 Point)                 [ ]  Section                                             (1 Point)  [ ] Congestive heart failure (in the previous month)  (1 Point)         [ ] Minor surgery                                                  (1 Point)   [ ] Inflammatory bowel disease                             (1 Point)                 [ ] Arthroscopic surgery                                        (2 Points)  [ ] Central venous access                                      (2 Points)                [ ] General surgery lasting more than 45 minutes   (2 Points)       [ ] Stroke (in the previous month)                          (5 Points)               [ ] Elective arthroplasty                                         (5 Points)            [ ] current or past malignancy                              (2 Points)                                                                                                       HEMATOLOGY RELATED FACTORS                                                 TRAUMA RELATED RISK FACTORS  [ ] Prior episodes of VTE                                     (3 Points)                [ ] Fracture of the hip, pelvis, or leg                       (5 Points)  [ ] Positive family history for VTE                         (3 Points)                 [ ] Acute spinal cord injury (in the previous month)  (5 Points)  [ ] Prothrombin 32087 A                                     (3 Points)                 [ ] Paralysis  (less than 1 month)                             (5 Points)  [ ] Factor V Leiden                                             (3 Points)                  [ ] Multiple Trauma within 1 month                        (5 Points)  [ ] Lupus anticoagulants                                     (3 Points)                                                           [ ] Anticardiolipin antibodies                               (3 Points)                                                       [ ] High homocysteine in the blood                      (3 Points)                                             [ ] Other congenital or acquired thrombophilia      (3 Points)                                                [ ] Heparin induced thrombocytopenia                  (3 Points)                                          Total Score [   3       ]    Risk:  Very low 0   Low 1 to 2   Moderate 3 to 4   High =5       VTE Prophylasix Recommednations:  [ x] mechanical pneumatic compression devices                                      [ ] contraindicated: _____________________  [x ] chemo prophylasix                                                                                   [ ] contraindicated _____________________    **** HIGH LIKELIHOOD DVT PRESENT ON ADMISSION  [x ] (please order LE dopplers within 24 hours of admission)

## 2021-09-04 NOTE — OCCUPATIONAL THERAPY INITIAL EVALUATION ADULT - PRECAUTIONS/LIMITATIONS, REHAB EVAL
Since the fall, the wife has noted patient is not himself. He will act bizarre and more forgetful.  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 CT head stable post angio however change in neuro exam post-procedure./fall precautions

## 2021-09-04 NOTE — PHYSICAL THERAPY INITIAL EVALUATION ADULT - IMPAIRMENTS FOUND, PT EVAL
arousal, attention, and cognition/cognitive impairment/gait, locomotion, and balance/muscle strength

## 2021-09-04 NOTE — PHYSICAL THERAPY INITIAL EVALUATION ADULT - ADDITIONAL COMMENTS
Pt poor historian, social hx obtained via chart review (seen by PT at [facility] 8/28) Patient lives with his wife in apartment, + elevator. Patient was independent prior to admission in ADL. Patient was using cane prior to admission for ambulation as well as walker. Patient states his wife would be able to assist if needed at home.

## 2021-09-04 NOTE — OCCUPATIONAL THERAPY INITIAL EVALUATION ADULT - LIVES WITH, PROFILE
Pt unable to provide history at this time. Per chart review, pt lives in an apartment with his wife, +elevator access. PTA was IND with ADL/mobility.

## 2021-09-04 NOTE — PHYSICAL THERAPY INITIAL EVALUATION ADULT - GAIT DEVIATIONS NOTED, PT EVAL
decreased stiven/increased time in double stance/decreased step length/decreased stride length/decreased weight-shifting ability

## 2021-09-04 NOTE — PROGRESS NOTE ADULT - SUBJECTIVE AND OBJECTIVE BOX
HPI:  86 yo man transferred from [facility] after L SDH evacuation for MMA embolization done 9/3, post procedure, found to have new word finding difficulty. MRI negative for stroke. Found to have a UTI.    ICU Vital Signs Last 24 Hrs  T(C): 37 (04 Sep 2021 07:00), Max: 37 (04 Sep 2021 07:00)  T(F): 98.6 (04 Sep 2021 07:00), Max: 98.6 (04 Sep 2021 07:00)  HR: 81 (04 Sep 2021 15:00) (81 - 103)  BP: 131/68 (04 Sep 2021 15:00) (94/73 - 157/95)  BP(mean): 87 (04 Sep 2021 15:00) (74 - 114)  ABP: --  ABP(mean): --  RR: 14 (04 Sep 2021 15:00) (13 - 20)  SpO2: 99% (04 Sep 2021 15:00) (94% - 100%)    09-03-21 @ 07:01  -  09-04-21 @ 07:00  --------------------------------------------------------  IN: 810 mL / OUT: 950 mL / NET: -140 mL    09-04-21 @ 07:01  -  09-04-21 @ 19:17  --------------------------------------------------------  IN: 840 mL / OUT: 550 mL / NET: 290 mL      LABS:  Na: 137 (09-03 @ 16:15), 137 (09-03 @ 02:59), 137 (09-02 @ 01:37)  K: 4.2 (09-03 @ 16:15), 4.6 (09-03 @ 02:59), 4.5 (09-02 @ 01:37)  Cl: 103 (09-03 @ 16:15), 103 (09-03 @ 02:59), 102 (09-02 @ 01:37)  CO2: 22 (09-03 @ 16:15), 23 (09-03 @ 02:59), 22 (09-02 @ 01:37)  BUN: 13 (09-03 @ 16:15), 17 (09-03 @ 02:59), 16 (09-02 @ 01:37)  Cr: 0.80 (09-03 @ 16:15), 0.97 (09-03 @ 02:59), 0.94 (09-02 @ 01:37)  Glu: 98(09-03 @ 16:15), 96(09-03 @ 02:59), 110(09-02 @ 01:37)    Hgb: 13.3 (09-03 @ 16:15), 12.6 (09-03 @ 02:59), 11.7 (09-02 @ 01:37)  Hct: 40.1 (09-03 @ 16:15), 37.5 (09-03 @ 02:59), 35.2 (09-02 @ 01:37)  WBC: 7.42 (09-03 @ 16:15), 7.98 (09-03 @ 02:59), 6.66 (09-02 @ 01:37)  Plt: 229 (09-03 @ 16:15), 200 (09-03 @ 02:59), 198 (09-02 @ 01:37)    INR:   PTT:     IMAGING:   Recent imaging studies were reviewed.    MEDICATIONS:  acetaminophen   Tablet .. 650 milliGRAM(s) Oral every 6 hours PRN  cefTRIAXone   IVPB      enoxaparin Injectable 40 milliGRAM(s) SubCutaneous <User Schedule>  levETIRAcetam 750 milliGRAM(s) Oral two times a day  lisinopril 10 milliGRAM(s) Oral daily  polyethylene glycol 3350 17 Gram(s) Oral two times a day  senna 2 Tablet(s) Oral at bedtime    EXAMINATION:  General:  calm  HEENT:  MMM  Neuro:  awake, alert, attends, oriented only to name with choices, able to name pen with choices, with some receptive aphasia, follows some simple commands, EOMI, with drift in R arm and R leg, no drift on the L, not able to participate in confrontation testing   Cards:  RRR  Respiratory:  no respiratory distress  Adomen:  soft  Extremities:  no edema. Groin site without hematoma   Skin:  warm/dry

## 2021-09-04 NOTE — PHYSICAL THERAPY INITIAL EVALUATION ADULT - MANUAL MUSCLE TESTING RESULTS, REHAB EVAL
BLE & LUE at least 3/5, RUE observed to be 3/5 (however unable to formally test RUE 2/2 poor command follow & apparent inattention./grossly assessed due to

## 2021-09-04 NOTE — OCCUPATIONAL THERAPY INITIAL EVALUATION ADULT - BALANCE DISTURBANCE, IDENTIFIED IMPAIRMENT CONTRIBUTE, REHAB EVAL
impaired coordination/impaired motor control/impaired postural control/impaired sensory feedback/decreased strength

## 2021-09-04 NOTE — PHYSICAL THERAPY INITIAL EVALUATION ADULT - GENERAL OBSERVATIONS, REHAB EVAL
Pt tolerated 45min PT initial evaluation well. Pt rec'd in bed in NAD, +video EEG, ICU monitoring, condom cath, OT Rowan bedside.

## 2021-09-05 LAB
ANION GAP SERPL CALC-SCNC: 10 MMOL/L — SIGNIFICANT CHANGE UP (ref 5–17)
BUN SERPL-MCNC: 16 MG/DL — SIGNIFICANT CHANGE UP (ref 7–23)
CALCIUM SERPL-MCNC: 9.1 MG/DL — SIGNIFICANT CHANGE UP (ref 8.4–10.5)
CHLORIDE SERPL-SCNC: 105 MMOL/L — SIGNIFICANT CHANGE UP (ref 96–108)
CO2 SERPL-SCNC: 21 MMOL/L — LOW (ref 22–31)
CREAT SERPL-MCNC: 0.92 MG/DL — SIGNIFICANT CHANGE UP (ref 0.5–1.3)
CULTURE RESULTS: NO GROWTH — SIGNIFICANT CHANGE UP
GLUCOSE SERPL-MCNC: 102 MG/DL — HIGH (ref 70–99)
HCT VFR BLD CALC: 36.6 % — LOW (ref 39–50)
HGB BLD-MCNC: 12.1 G/DL — LOW (ref 13–17)
MAGNESIUM SERPL-MCNC: 2.1 MG/DL — SIGNIFICANT CHANGE UP (ref 1.6–2.6)
MCHC RBC-ENTMCNC: 28.5 PG — SIGNIFICANT CHANGE UP (ref 27–34)
MCHC RBC-ENTMCNC: 33.1 GM/DL — SIGNIFICANT CHANGE UP (ref 32–36)
MCV RBC AUTO: 86.3 FL — SIGNIFICANT CHANGE UP (ref 80–100)
NRBC # BLD: 0 /100 WBCS — SIGNIFICANT CHANGE UP (ref 0–0)
PHOSPHATE SERPL-MCNC: 2.5 MG/DL — SIGNIFICANT CHANGE UP (ref 2.5–4.5)
PLATELET # BLD AUTO: 209 K/UL — SIGNIFICANT CHANGE UP (ref 150–400)
POTASSIUM SERPL-MCNC: 4.5 MMOL/L — SIGNIFICANT CHANGE UP (ref 3.5–5.3)
POTASSIUM SERPL-SCNC: 4.5 MMOL/L — SIGNIFICANT CHANGE UP (ref 3.5–5.3)
RBC # BLD: 4.24 M/UL — SIGNIFICANT CHANGE UP (ref 4.2–5.8)
RBC # FLD: 13.5 % — SIGNIFICANT CHANGE UP (ref 10.3–14.5)
SODIUM SERPL-SCNC: 136 MMOL/L — SIGNIFICANT CHANGE UP (ref 135–145)
SPECIMEN SOURCE: SIGNIFICANT CHANGE UP
WBC # BLD: 8.29 K/UL — SIGNIFICANT CHANGE UP (ref 3.8–10.5)
WBC # FLD AUTO: 8.29 K/UL — SIGNIFICANT CHANGE UP (ref 3.8–10.5)

## 2021-09-05 PROCEDURE — 95718 EEG PHYS/QHP 2-12 HR W/VEEG: CPT

## 2021-09-05 RX ADMIN — ENOXAPARIN SODIUM 40 MILLIGRAM(S): 100 INJECTION SUBCUTANEOUS at 16:56

## 2021-09-05 RX ADMIN — LEVETIRACETAM 750 MILLIGRAM(S): 250 TABLET, FILM COATED ORAL at 05:33

## 2021-09-05 RX ADMIN — LEVETIRACETAM 750 MILLIGRAM(S): 250 TABLET, FILM COATED ORAL at 16:55

## 2021-09-05 RX ADMIN — LISINOPRIL 10 MILLIGRAM(S): 2.5 TABLET ORAL at 05:34

## 2021-09-05 RX ADMIN — SENNA PLUS 2 TABLET(S): 8.6 TABLET ORAL at 22:42

## 2021-09-05 RX ADMIN — POLYETHYLENE GLYCOL 3350 17 GRAM(S): 17 POWDER, FOR SOLUTION ORAL at 16:56

## 2021-09-05 RX ADMIN — CEFTRIAXONE 100 MILLIGRAM(S): 500 INJECTION, POWDER, FOR SOLUTION INTRAMUSCULAR; INTRAVENOUS at 00:34
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
## 2021-09-05 NOTE — EEG REPORT - NS EEG TEXT BOX
[facility]   COMPREHENSIVE EPILEPSY CENTER   REPORT OF LONG-TERM VIDEO EEG     [facility]: [address], [place] [address], Ph#: [phone]  [facility]: [id number] [place] [address], Ph#: [phone]  [facility]: [address] [place] [address], Ph#: [phone]    Patient Name: JOEL BARBER  Age and : 87y (34)  MRN #: [id number]  Location: [address]  Referring Physician: Ilia Pace    Start Time/Date: 08:00 on 21  End Time/Date: 19:40 on 21  Duration: 11 hours 40 mins  _____________________________________________________________  STUDY INFORMATION    EEG Recording Technique:  The patient underwent continuous Video-EEG monitoring, using Telemetry System hardware on the XLTek Digital System. EEG and video data were stored on a computer hard drive with important events saved in digital archive files. The material was reviewed by a physician (electroencephalographer / epileptologist) on a daily basis. Federico and seizure detection algorithms were utilized and reviewed. An EEG Technician attended to the patient, and was available throughout daytime work hours.  The epilepsy center neurologist was available in person or on call 24-hours per day.    EEG Placement and Labeling of Electrodes:  The EEG was performed utilizing 20 channel referential EEG connections (coronal over temporal over parasagittal montage) using all standard 10-20 electrode placements with EKG, with additional electrodes placed in the inferior temporal region using the modified 10-10 montage electrode placements for elective admissions, or if deemed necessary. Recording was at a sampling rate of 256 samples per second per channel. Time synchronized digital video recording was done simultaneously with EEG recording. A low light infrared camera was used for low light recording.     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    PERTINENT MEDICATION:    levETIRAcetam 750 milliGRAM(s) Oral two times a day    _____________________________________________________________  STUDY INTERPRETATION    Findings: The background was continuous and reactive. During wakefulness, the posterior dominant rhythm consisted of fragments of 6.5 Hz activity.    Background Slowing:  Diffuse theta and delta slowing.    Focal Slowing:   Continuous theta polymorphic delta slowing in the left hemisphere.    Sleep Background:  Drowsiness and stage II sleep transients were not recorded.    Other Non-Epileptiform Findings:  None were present.    Interictal Epileptiform Activity:   None were present.    Events:  Clinical events: None recorded.  Seizures: None recorded.    Activation Procedures:   Hyperventilation was not performed.    Photic stimulation was performed and did not elicit any abnormality.     Artifacts:  Intermittent myogenic and movement artifacts were noted.    ECG:  The heart rate on single channel ECG was predominantly between 80 -100 BPM.    _____________________________________________________________  EEG SUMMARY/CLASSIFICATION    Abnormal EEG in an encephalopathic patient:    - Continuous theta polymorphic delta slowing in the left hemisphere.  - Mild-moderate generalized slowing including PDR  < 8Hz.  - Tachycardia noted, correlate clinically and with 12-lead ECG.    _____________________________________________________________  EEG IMPRESSION/CLINICAL CORRELATE    Abnormal EEG study.  1. Structural abnormality in the left hemisphere.  2. Mild-moderate nonspecific diffuse or multifocal cerebral dysfunction.   3. No epileptiform pattern or seizure seen.    *** PRELIMINARY REPORT - PENDING EPILEPSY ATTENDING REVIEW ***    _____________________________________________________________    Tyrone Reid MD, MBA  Fellow, [facility]     [facility]   COMPREHENSIVE EPILEPSY CENTER   REPORT OF LONG-TERM VIDEO EEG     [facility]: [address], [place] [address], Ph#: [phone]  [facility]: [address] [place] [address], Ph#: [phone]  [facility]: [address] [place] [address], Ph#: [phone]    Patient Name: JOEL BARBER  Age and : 87y (34)  MRN #: [id number]  Location: [address]  Referring Physician: Ilia Pace    Start Time/Date: 08:00 on 21  End Time/Date: 19:40 on 21  Duration: 11 hours 40 mins  _____________________________________________________________  STUDY INFORMATION    EEG Recording Technique:  The patient underwent continuous Video-EEG monitoring, using Telemetry System hardware on the XLTek Digital System. EEG and video data were stored on a computer hard drive with important events saved in digital archive files. The material was reviewed by a physician (electroencephalographer / epileptologist) on a daily basis. Federico and seizure detection algorithms were utilized and reviewed. An EEG Technician attended to the patient, and was available throughout daytime work hours.  The epilepsy center neurologist was available in person or on call 24-hours per day.    EEG Placement and Labeling of Electrodes:  The EEG was performed utilizing 20 channel referential EEG connections (coronal over temporal over parasagittal montage) using all standard 10-20 electrode placements with EKG, with additional electrodes placed in the inferior temporal region using the modified 10-10 montage electrode placements for elective admissions, or if deemed necessary. Recording was at a sampling rate of 256 samples per second per channel. Time synchronized digital video recording was done simultaneously with EEG recording. A low light infrared camera was used for low light recording.     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    PERTINENT MEDICATION:    levETIRAcetam 750 milliGRAM(s) Oral two times a day    _____________________________________________________________  STUDY INTERPRETATION    Findings: The background was continuous and reactive. During wakefulness, the posterior dominant rhythm consisted of fragments of 6.5 Hz activity.    Background Slowing:  Diffuse theta and delta slowing.    Focal Slowing:   Continuous theta polymorphic delta slowing in the left hemisphere.    Sleep Background:  Drowsiness and stage II sleep transients were not recorded.    Other Non-Epileptiform Findings:  None were present.    Interictal Epileptiform Activity:   None were present.    Events:  Clinical events: None recorded.  Seizures: None recorded.    Activation Procedures:   Hyperventilation was not performed.    Photic stimulation was performed and did not elicit any abnormality.     Artifacts:  Intermittent myogenic and movement artifacts were noted.    ECG:  The heart rate on single channel ECG was predominantly between 80 -100 BPM.    _____________________________________________________________  EEG SUMMARY/CLASSIFICATION    Abnormal EEG in an encephalopathic patient:  - Continuous theta polymorphic delta slowing in the left hemisphere.  - Mild-moderate generalized slowing including PDR  < 8Hz.  - Tachycardia noted, correlate clinically and with 12-lead ECG.    _____________________________________________________________  EEG IMPRESSION/CLINICAL CORRELATE    Abnormal EEG study.  1. Structural abnormality in the left hemisphere.  2. Mild-moderate nonspecific diffuse or multifocal cerebral dysfunction.   3. No epileptiform pattern or seizure seen.    _____________________________________________________________    Tyrone Reid MD, TARYN  Fellow, [facility]

## 2021-09-05 NOTE — CONSULT NOTE ADULT - TIME BILLING
87-year-old gentleman 1st evaluated at [facility] on 9/6/21 with aphasia.  History and exam as above.  ROS otherwise negative.  Impression.  Mixed or global aphasia with mild right hemiparesis consistent with left hemispheric dysfunction.  No evidence of acute infarction or seizures.  Suspect that the aphasia may still be due to mass-effect from the subdural hematoma, although why it was not present previously is unknown.  Suggest.  Further management as per neurosurgery.
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
## 2021-09-05 NOTE — PROGRESS NOTE ADULT - SUBJECTIVE AND OBJECTIVE BOX
SUBJECTIVE:   No a/c distress   OVERNIGHT EVENTS: none    Vital Signs Last 24 Hrs  T(C): 36.7 (05 Sep 2021 08:14), Max: 36.7 (04 Sep 2021 20:00)  T(F): 98.1 (05 Sep 2021 08:14), Max: 98.1 (04 Sep 2021 20:00)  HR: 100 (05 Sep 2021 08:14) (80 - 100)  BP: 137/87 (05 Sep 2021 08:14) (126/75 - 154/87)  BP(mean): 87 (04 Sep 2021 15:00) (87 - 87)  RR: 17 (05 Sep 2021 08:14) (14 - 18)  SpO2: 100% (05 Sep 2021 08:14) (96% - 100%)    PHYSICAL EXAM:    Constitutional: No Acute Distress     Neurological: Awake oriented to self, hospital with choices. Mixed aphasia. Able to follow some simple commands. Severe expressive aphasia  Answers yes, no & okay.  Moving all Extremities with good strength mild RUE drift     Pulmonary: Clear to Auscultation,    Cardiovascular: S1, S2, Regular rate and rhythm     Gastrointestinal: Soft, Non-tender, Non-distended     Extremities: No calf tenderness     LABS:                        12.1   8.29  )-----------( 209      ( 05 Sep 2021 05:58 )             36.6    09    136  |  105  |  16  ----------------------------<  102<H>  4.5   |  21<L>  |  0.92    Ca    9.1      05 Sep 2021 05:58  Phos  2.5       Mg     2.1               IMAGIN/4 EEG negative for seizures or epileptiform activity      CTH- Slightly decreased size of mixed density left lateral convexity subdural collection, currently measuring 1.7 cm in greatest depth, previously measuring 1.9 cm.    Decreased rightward midline shift, previously 0.7 cm, currently 0.5 cm  MEDICATIONS:  cefTRIAXone   IVPB      cefTRIAXone   IVPB 1000 milliGRAM(s) IV Intermittent every 24 hours  acetaminophen   Tablet .. 650 milliGRAM(s) Oral every 6 hours PRN Temp greater or equal to 38C (100.4F), Mild Pain (1 - 3)  levETIRAcetam 750 milliGRAM(s) Oral two times a day  lisinopril 10 milliGRAM(s) Oral daily  polyethylene glycol 3350 17 Gram(s) Oral two times a day  senna 2 Tablet(s) Oral at bedtime  enoxaparin Injectable 40 milliGRAM(s) SubCutaneous <User Schedule>      DIET:

## 2021-09-06 PROCEDURE — 99223 1ST HOSP IP/OBS HIGH 75: CPT

## 2021-09-06 PROCEDURE — 99231 SBSQ HOSP IP/OBS SF/LOW 25: CPT | Mod: 24

## 2021-09-06 RX ADMIN — LISINOPRIL 10 MILLIGRAM(S): 2.5 TABLET ORAL at 06:20

## 2021-09-06 RX ADMIN — LEVETIRACETAM 750 MILLIGRAM(S): 250 TABLET, FILM COATED ORAL at 06:20

## 2021-09-06 RX ADMIN — POLYETHYLENE GLYCOL 3350 17 GRAM(S): 17 POWDER, FOR SOLUTION ORAL at 06:20

## 2021-09-06 RX ADMIN — POLYETHYLENE GLYCOL 3350 17 GRAM(S): 17 POWDER, FOR SOLUTION ORAL at 16:57

## 2021-09-06 RX ADMIN — SENNA PLUS 2 TABLET(S): 8.6 TABLET ORAL at 21:55

## 2021-09-06 RX ADMIN — LEVETIRACETAM 750 MILLIGRAM(S): 250 TABLET, FILM COATED ORAL at 16:56

## 2021-09-06 RX ADMIN — ENOXAPARIN SODIUM 40 MILLIGRAM(S): 100 INJECTION SUBCUTANEOUS at 16:57

## 2021-09-06 NOTE — SPEECH LANGUAGE PATHOLOGY EVALUATION - ALTERNATING RAPID SOUNDS
Pt imitated "buttercup" x 3 with adequate articulatory precision although decreased rate of speech noted.

## 2021-09-06 NOTE — PROGRESS NOTE ADULT - SUBJECTIVE AND OBJECTIVE BOX
SUBJECTIVE:   Pleasant   OVERNIGHT EVENTS: none    Vital Signs Last 24 Hrs  T(C): 36.9 (06 Sep 2021 12:58), Max: 37 (05 Sep 2021 20:17)  T(F): 98.5 (06 Sep 2021 12:58), Max: 98.6 (05 Sep 2021 20:17)  HR: 86 (06 Sep 2021 12:58) (56 - 96)  BP: 130/73 (06 Sep 2021 12:58) (107/69 - 155/81)  BP(mean): --  RR: 18 (06 Sep 2021 12:58) (18 - 18)  SpO2: 97% (06 Sep 2021 12:58) (96% - 98%)    PHYSICAL EXAM:      Neurological: Awake oriented to self, hospital with choices. Mixed aphasia. Named pen, cell phone. repeats  2-3 word phrases  Able to follow  simple commands.  Moving all Extremities with good strength except RUE 4+/5  RUE drift     Pulmonary: Clear to Auscultation,    Cardiovascular: S1, S2, Regular rate and rhythm     Gastrointestinal: Soft, Non-tender, Non-distended     Extremities: No calf tenderness       LABS:                        12.1   8.29  )-----------( 209      ( 05 Sep 2021 05:58 )             36.6    09-05    136  |  105  |  16  ----------------------------<  102<H>  4.5   |  21<L>  |  0.92    Ca    9.1      05 Sep 2021 05:58  Phos  2.5     09-05  Mg     2.1     09-05        IMAGING:         MEDICATIONS:  acetaminophen   Tablet .. 650 milliGRAM(s) Oral every 6 hours PRN Temp greater or equal to 38C (100.4F), Mild Pain (1 - 3)  levETIRAcetam 750 milliGRAM(s) Oral two times a day  lisinopril 10 milliGRAM(s) Oral jennie  polyethylene glycol 3350 17 Gram(s) Oral two times a day  senna 2 Tablet(s) Oral at bedtime:   enoxaparin Injectable 40 milliGRAM(s) SubCutaneous <User Schedule>      DIET:
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
## 2021-09-06 NOTE — SPEECH LANGUAGE PATHOLOGY EVALUATION - DISCOURSE
Addended by: CHEN GLASER on: 12/12/2019 03:01 PM     Modules accepted: Orders     verbal repetition required at sentence level/impaired

## 2021-09-06 NOTE — SPEECH LANGUAGE PATHOLOGY EVALUATION - RESPONSIVE NAMING
unable to provide object function or name object given function; Pt inconsistently demonstrated object function via gesture in response to question./impaired
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
## 2021-09-06 NOTE — SPEECH LANGUAGE PATHOLOGY EVALUATION - SLP REPETITION
Pt imitated 3/3, 3 word phrases; breakdown with longer utterances; suspect decreased recall may be contributing factor/impaired/sentence
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
## 2021-09-07 LAB — SARS-COV-2 RNA SPEC QL NAA+PROBE: SIGNIFICANT CHANGE UP

## 2021-09-07 PROCEDURE — 99024 POSTOP FOLLOW-UP VISIT: CPT

## 2021-09-07 RX ADMIN — LEVETIRACETAM 750 MILLIGRAM(S): 250 TABLET, FILM COATED ORAL at 17:37

## 2021-09-07 RX ADMIN — ENOXAPARIN SODIUM 40 MILLIGRAM(S): 100 INJECTION SUBCUTANEOUS at 17:37

## 2021-09-07 RX ADMIN — LISINOPRIL 10 MILLIGRAM(S): 2.5 TABLET ORAL at 05:39

## 2021-09-07 RX ADMIN — POLYETHYLENE GLYCOL 3350 17 GRAM(S): 17 POWDER, FOR SOLUTION ORAL at 05:39

## 2021-09-07 RX ADMIN — SENNA PLUS 2 TABLET(S): 8.6 TABLET ORAL at 22:40

## 2021-09-07 RX ADMIN — LEVETIRACETAM 750 MILLIGRAM(S): 250 TABLET, FILM COATED ORAL at 05:39

## 2021-09-07 RX ADMIN — POLYETHYLENE GLYCOL 3350 17 GRAM(S): 17 POWDER, FOR SOLUTION ORAL at 17:37

## 2021-09-07 NOTE — PROGRESS NOTE ADULT - SUBJECTIVE AND OBJECTIVE BOX
SUBJECTIVE: Patient seen and examined. Patient is improving     Vital Signs Last 24 Hrs  T(C): 36.8 (07 Sep 2021 07:31), Max: 37.3 (07 Sep 2021 00:02)  T(F): 98.2 (07 Sep 2021 07:31), Max: 99.1 (07 Sep 2021 00:02)  HR: 77 (07 Sep 2021 07:31) (77 - 98)  BP: 133/73 (07 Sep 2021 07:31) (107/69 - 149/83)  BP(mean): --  RR: 18 (07 Sep 2021 07:31) (18 - 18)  SpO2: 96% (07 Sep 2021 07:31) (96% - 98%)    PHYSICAL EXAM:    Constitutional: No Acute Distress     Neurological: A oriented to name and place not year.  has difficulty naming objects.  left side 5/5 rue drift, rue 4/5 and rle 4+/5    Pulmonary: Clear to Auscultation, No rales, No rhonchi, No wheezes     Cardiovascular: S1, S2, Regular rate and rhythm     Gastrointestinal: Soft, Non-tender, Non-distended     Extremities: No calf tenderness     Incision: c/d/i   LABS:           09-06 @ 07:01  -  09-07 @ 07:00  --------------------------------------------------------  IN: 960 mL / OUT: 500 mL / NET: 460 mL        MEDICATIONS:  Anticoagulation:   enoxaparin Injectable 40 milliGRAM(s) SubCutaneous <User Schedule>    Antibiotics:    Endo:    Neuro:  acetaminophen   Tablet .. 650 milliGRAM(s) Oral every 6 hours PRN Temp greater or equal to 38C (100.4F), Mild Pain (1 - 3)  levETIRAcetam 750 milliGRAM(s) Oral two times a day    Cardiac:  lisinopril 10 milliGRAM(s) Oral daily    Pulm:    GI/:  polyethylene glycol 3350 17 Gram(s) Oral two times a day  senna 2 Tablet(s) Oral at bedtime    Other:     DIET: mechanical soft    IMAGING:

## 2021-09-08 DIAGNOSIS — S06.5X9A TRAUMATIC SUBDURAL HEMORRHAGE WITH LOSS OF CONSCIOUSNESS OF UNSPECIFIED DURATION, INITIAL ENCOUNTER: ICD-10-CM

## 2021-09-08 LAB — LMWH PPP CHRO-ACNC: 0.18 IU/ML — LOW (ref 0.5–1.1)

## 2021-09-08 PROCEDURE — 99024 POSTOP FOLLOW-UP VISIT: CPT

## 2021-09-08 PROCEDURE — 93970 EXTREMITY STUDY: CPT | Mod: 26

## 2021-09-08 PROCEDURE — 99222 1ST HOSP IP/OBS MODERATE 55: CPT

## 2021-09-08 RX ADMIN — POLYETHYLENE GLYCOL 3350 17 GRAM(S): 17 POWDER, FOR SOLUTION ORAL at 06:02

## 2021-09-08 RX ADMIN — SENNA PLUS 2 TABLET(S): 8.6 TABLET ORAL at 21:06

## 2021-09-08 RX ADMIN — LEVETIRACETAM 750 MILLIGRAM(S): 250 TABLET, FILM COATED ORAL at 17:33

## 2021-09-08 RX ADMIN — ENOXAPARIN SODIUM 40 MILLIGRAM(S): 100 INJECTION SUBCUTANEOUS at 17:33

## 2021-09-08 RX ADMIN — LEVETIRACETAM 750 MILLIGRAM(S): 250 TABLET, FILM COATED ORAL at 06:02

## 2021-09-08 RX ADMIN — POLYETHYLENE GLYCOL 3350 17 GRAM(S): 17 POWDER, FOR SOLUTION ORAL at 17:33

## 2021-09-08 RX ADMIN — LISINOPRIL 10 MILLIGRAM(S): 2.5 TABLET ORAL at 06:03
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
## 2021-09-08 NOTE — PROGRESS NOTE ADULT - SUBJECTIVE AND OBJECTIVE BOX
SUBJECTIVE: Doing well w/o complaints. NAD    OVERNIGHT EVENTS: None    Vital Signs Last 24 Hrs  T(C): 36.3 (08 Sep 2021 08:36), Max: 36.9 (07 Sep 2021 12:23)  T(F): 97.4 (08 Sep 2021 08:36), Max: 98.4 (07 Sep 2021 12:23)  HR: 84 (08 Sep 2021 08:36) (72 - 99)  BP: 127/75 (08 Sep 2021 08:36) (106/68 - 137/81)  BP(mean): --  RR: 18 (08 Sep 2021 08:36) (18 - 18)  SpO2: 99% (08 Sep 2021 08:36) (95% - 99%)  IVF: [ X] IVL [ ] NS+K@   DIET: [ X] Regular-Soft Mechanical [ ] CCD [ ] Renal [ ] Puree [ ] Dysphagia [ ] Tube Feeds:   PCA: [ ] YES [X ] NO   MCNAMARA: [ ] YES [ X] NO [ X] VOID   BM: [X ] YES-on 9/4    DRAINS: None    PHYSICAL EXAM:    General: No Acute Distress     Neurological: Very cooperative.  Alert & oriented to name and place not year.  has difficulty naming objects-aphasia.  left side 5/5 rue drift, rue 4/5 and rle 4+/5    Pulmonary: Clear to Auscultation, No Rales, No Rhonchi, No Wheezes     Cardiovascular: S1, S2, Regular Rate and Rhythm     Gastrointestinal: Soft, Nontender, Nondistended     Incision: +steri-CDI/Flat    LABS:     COVID-19 PCR: NotDetec (07 Sep 2021 11:10)  COVID-19 PCR: NotDetec (02 Sep 2021 19:42)  COVID-19 PCR: NotDetec (31 Aug 2021 09:27)  COVID-19 PCR: NotDetec (27 Aug 2021 20:36)    IMAGING:   Abnormal EEG study.  1. Structural abnormality in the left hemisphere.  2. Mild-moderate nonspecific diffuse or multifocal cerebral dysfunction.   3. No epileptiform pattern or seizure seen.  Tyrone Reid MD, TARYN  Fellow, [facility] [place]    < from: CT Head No Cont (09.04.21 @ 08:55) >  Slightly decreased size of mixed density left lateral convexity subdural collection, currently measuring 1.7 cm in greatest depth, previously measuring 1.9 cm.    Decreased rightward midline shift, previously 0.7 cm, currently 0.5 cm. Basal cisterns are visualized. No large hydrocephalus.    MEDICATIONS  (STANDING):  enoxaparin Injectable 40 milliGRAM(s) SubCutaneous <User Schedule>  levETIRAcetam 750 milliGRAM(s) Oral two times a day  lisinopril 10 milliGRAM(s) Oral daily  polyethylene glycol 3350 17 Gram(s) Oral two times a day  senna 2 Tablet(s) Oral at bedtime    MEDICATIONS  (PRN):  acetaminophen   Tablet .. 650 milliGRAM(s) Oral every 6 hours PRN Temp greater or equal to 38C (100.4F), Mild Pain (1 - 3)
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
## 2021-09-09 ENCOUNTER — TRANSCRIPTION ENCOUNTER (OUTPATIENT)
Age: 86
End: 2021-09-09

## 2021-09-09 LAB
CULTURE RESULTS: SIGNIFICANT CHANGE UP
CULTURE RESULTS: SIGNIFICANT CHANGE UP
SPECIMEN SOURCE: SIGNIFICANT CHANGE UP
SPECIMEN SOURCE: SIGNIFICANT CHANGE UP

## 2021-09-09 PROCEDURE — 99024 POSTOP FOLLOW-UP VISIT: CPT

## 2021-09-09 RX ADMIN — LEVETIRACETAM 750 MILLIGRAM(S): 250 TABLET, FILM COATED ORAL at 05:18

## 2021-09-09 RX ADMIN — SENNA PLUS 2 TABLET(S): 8.6 TABLET ORAL at 21:08

## 2021-09-09 RX ADMIN — POLYETHYLENE GLYCOL 3350 17 GRAM(S): 17 POWDER, FOR SOLUTION ORAL at 05:17

## 2021-09-09 RX ADMIN — LISINOPRIL 10 MILLIGRAM(S): 2.5 TABLET ORAL at 05:17

## 2021-09-09 RX ADMIN — LEVETIRACETAM 750 MILLIGRAM(S): 250 TABLET, FILM COATED ORAL at 17:51

## 2021-09-09 RX ADMIN — ENOXAPARIN SODIUM 40 MILLIGRAM(S): 100 INJECTION SUBCUTANEOUS at 17:51

## 2021-09-09 NOTE — PROGRESS NOTE ADULT - ATTENDING COMMENTS
Rehab pending.
Post-embo mixed aphasia of uncertain etiology. MRI negative for stroke, CT stable in terms of hematoma size. Neurology consult appreciated.

## 2021-09-09 NOTE — DISCHARGE NOTE PROVIDER - NSDCACTIVITY_GEN_ALL_CORE
Do not drive or operate machinery/Do not make important decisions/Walking - Indoors allowed/No heavy lifting/straining/Walking - Outdoors allowed/Follow Instructions Provided by your Surgical Team

## 2021-09-09 NOTE — DISCHARGE NOTE PROVIDER - CARE PROVIDER_API CALL
Ilia Pace (MD; MS)  Unallocated  [address] [facility], [address] [place] [id number]  Phone: [phone]  Fax: [phone]  Follow Up Time:

## 2021-09-09 NOTE — DISCHARGE NOTE PROVIDER - NSDCFUADDINST_GEN_ALL_CORE_FT
NO heavy lifting, strenous activity, twisting, bending, driving, or working until cleared by your physician.  Please return to the emergency department if you develop changes in mental status, seizures, fainting, dizziness, changes in vision, lethargy, nausea, vomiting, chest pain, shortness of breathe or severe pain.  please keep incision clean and dry, do not submerge wound in water for prolonged periods of time, pat dry after showering, and do not use any creams or ointments to incision.
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
## 2021-09-09 NOTE — PROGRESS NOTE ADULT - SUBJECTIVE AND OBJECTIVE BOX
SUBJECTIVE: Patient seen and examined at bedside. Denies any complaints at this time.     OVERNIGHT EVENTS: none     Vital Signs Last 24 Hrs  T(C): 36.8 (09 Sep 2021 08:14), Max: 37.1 (08 Sep 2021 15:13)  T(F): 98.3 (09 Sep 2021 08:14), Max: 98.7 (08 Sep 2021 15:13)  HR: 89 (09 Sep 2021 08:14) (75 - 93)  BP: 164/91 (09 Sep 2021 08:14) (133/73 - 164/91)  BP(mean): --  RR: 18 (09 Sep 2021 08:14) (17 - 18)  SpO2: 97% (09 Sep 2021 08:14) (97% - 98%)    PHYSICAL EXAM:    General: No Acute Distress     Neurological: Alert & oriented to name and place not year.  has difficulty naming objects-aphasia.  left side 5/5 rue drift, rue 4/5 and rle 4+/5    Pulmonary: Clear to Auscultation, No Rales, No Rhonchi, No Wheezes     Cardiovascular: S1, S2, Regular Rate and Rhythm     Gastrointestinal: Soft, Nontender, Nondistended     Incision: c/d/i    LABS: no new labs      MEDICATIONS  (STANDING):  enoxaparin Injectable 40 milliGRAM(s) SubCutaneous <User Schedule>  levETIRAcetam 750 milliGRAM(s) Oral two times a day  lisinopril 10 milliGRAM(s) Oral daily  polyethylene glycol 3350 17 Gram(s) Oral two times a day  senna 2 Tablet(s) Oral at bedtime    MEDICATIONS  (PRN):  acetaminophen   Tablet .. 650 milliGRAM(s) Oral every 6 hours PRN Temp greater or equal to 38C (100.4F), Mild Pain (1 - 3)      DIET: mechanical soft     IMAGING: < from: [facility] Duplex Lower Ext Vein Scan, Bilat (09.08.21 @ 16:01) >    IMPRESSION:  No evidence of deep venous thrombosis in either lower extremity.    < end of copied text >

## 2021-09-09 NOTE — DISCHARGE NOTE PROVIDER - NSDCMRMEDTOKEN_GEN_ALL_CORE_FT
acetaminophen 500 mg oral tablet: 1 tab(s) orally every 6 hours, As needed, Mild Pain (1 - 3)  amLODIPine 5 mg oral tablet: 1 tab(s) orally once a day  labetalol 5 mg/mL intravenous solution: 1 milliliter(s) intravenous every 6 hours, As needed, Systolic blood pressure &gt;160  levETIRAcetam 100 mg/mL intravenous solution: 5 milliliter(s) intravenous every 12 hours  lisinopril 10 mg oral tablet: 1 tab(s) orally once a day in pm  oxyCODONE 5 mg oral tablet: 1 tab(s) orally every 4 hours, As needed, Moderate Pain (4 - 6)  polyethylene glycol 3350 oral powder for reconstitution: 17 gram(s) orally once a day  senna oral tablet: 2 tab(s) orally once a day (at bedtime)  sodium chloride 1 g oral tablet: 1 tab(s) orally 3 times a day   acetaminophen 325 mg oral tablet: 2 tab(s) orally every 6 hours, As needed, Temp greater or equal to 38C (100.4F), Mild Pain (1 - 3)  acetaminophen 500 mg oral tablet: 1 tab(s) orally every 6 hours, As needed, Mild Pain (1 - 3)  amLODIPine 5 mg oral tablet: 1 tab(s) orally once a day  enoxaparin: 40 unit(s) subcutaneous once a day (at bedtime)  levETIRAcetam 750 mg oral tablet: 1 tab(s) orally 2 times a day  lisinopril 10 mg oral tablet: 1 tab(s) orally once a day in pm  oxyCODONE 5 mg oral tablet: 1 tab(s) orally every 4 hours, As needed, Moderate Pain (4 - 6)  polyethylene glycol 3350 oral powder for reconstitution: 17 gram(s) orally once a day  senna oral tablet: 2 tab(s) orally once a day (at bedtime)  senna oral tablet: 2 tab(s) orally once a day (at bedtime)
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
## 2021-09-10 ENCOUNTER — TRANSCRIPTION ENCOUNTER (OUTPATIENT)
Age: 86
End: 2021-09-10

## 2021-09-10 VITALS
SYSTOLIC BLOOD PRESSURE: 109 MMHG | DIASTOLIC BLOOD PRESSURE: 66 MMHG | RESPIRATION RATE: 17 BRPM | OXYGEN SATURATION: 97 % | TEMPERATURE: 98 F | HEART RATE: 85 BPM

## 2021-09-10 PROCEDURE — 75894 X-RAYS TRANSCATH THERAPY: CPT

## 2021-09-10 PROCEDURE — U0003: CPT

## 2021-09-10 PROCEDURE — 85520 HEPARIN ASSAY: CPT

## 2021-09-10 PROCEDURE — 83605 ASSAY OF LACTIC ACID: CPT

## 2021-09-10 PROCEDURE — 97163 PT EVAL HIGH COMPLEX 45 MIN: CPT

## 2021-09-10 PROCEDURE — C1769: CPT

## 2021-09-10 PROCEDURE — 97116 GAIT TRAINING THERAPY: CPT

## 2021-09-10 PROCEDURE — 95711 VEEG 2-12 HR UNMONITORED: CPT

## 2021-09-10 PROCEDURE — 83735 ASSAY OF MAGNESIUM: CPT

## 2021-09-10 PROCEDURE — C1889: CPT

## 2021-09-10 PROCEDURE — 97530 THERAPEUTIC ACTIVITIES: CPT

## 2021-09-10 PROCEDURE — 82962 GLUCOSE BLOOD TEST: CPT

## 2021-09-10 PROCEDURE — 70551 MRI BRAIN STEM W/O DYE: CPT | Mod: MA

## 2021-09-10 PROCEDURE — 93970 EXTREMITY STUDY: CPT

## 2021-09-10 PROCEDURE — 61626 TCAT PERM OCCLS/EMBOL NONCNS: CPT

## 2021-09-10 PROCEDURE — 97167 OT EVAL HIGH COMPLEX 60 MIN: CPT

## 2021-09-10 PROCEDURE — 80048 BASIC METABOLIC PNL TOTAL CA: CPT

## 2021-09-10 PROCEDURE — 97110 THERAPEUTIC EXERCISES: CPT

## 2021-09-10 PROCEDURE — 87040 BLOOD CULTURE FOR BACTERIA: CPT

## 2021-09-10 PROCEDURE — 99024 POSTOP FOLLOW-UP VISIT: CPT

## 2021-09-10 PROCEDURE — C1894: CPT

## 2021-09-10 PROCEDURE — 70450 CT HEAD/BRAIN W/O DYE: CPT | Mod: MA

## 2021-09-10 PROCEDURE — 95700 EEG CONT REC W/VID EEG TECH: CPT

## 2021-09-10 PROCEDURE — 36223 PLACE CATH CAROTID/INOM ART: CPT

## 2021-09-10 PROCEDURE — C1887: CPT

## 2021-09-10 PROCEDURE — 84145 PROCALCITONIN (PCT): CPT

## 2021-09-10 PROCEDURE — C1760: CPT

## 2021-09-10 PROCEDURE — 92523 SPEECH SOUND LANG COMPREHEN: CPT

## 2021-09-10 PROCEDURE — 85027 COMPLETE CBC AUTOMATED: CPT

## 2021-09-10 PROCEDURE — 75898 FOLLOW-UP ANGIOGRAPHY: CPT

## 2021-09-10 PROCEDURE — 95714 VEEG EA 12-26 HR UNMNTR: CPT

## 2021-09-10 PROCEDURE — 87086 URINE CULTURE/COLONY COUNT: CPT

## 2021-09-10 PROCEDURE — 84100 ASSAY OF PHOSPHORUS: CPT

## 2021-09-10 PROCEDURE — U0005: CPT

## 2021-09-10 PROCEDURE — 81001 URINALYSIS AUTO W/SCOPE: CPT

## 2021-09-10 RX ORDER — LEVETIRACETAM 250 MG/1
1 TABLET, FILM COATED ORAL
Qty: 0 | Refills: 0 | DISCHARGE
Start: 2021-09-10

## 2021-09-10 RX ORDER — ENOXAPARIN SODIUM 100 MG/ML
40 INJECTION SUBCUTANEOUS
Qty: 0 | Refills: 0 | DISCHARGE
Start: 2021-09-10

## 2021-09-10 RX ORDER — ACETAMINOPHEN 500 MG
2 TABLET ORAL
Qty: 0 | Refills: 0 | DISCHARGE
Start: 2021-09-10

## 2021-09-10 RX ORDER — SENNA PLUS 8.6 MG/1
2 TABLET ORAL
Qty: 0 | Refills: 0 | DISCHARGE
Start: 2021-09-10

## 2021-09-10 RX ADMIN — LISINOPRIL 10 MILLIGRAM(S): 2.5 TABLET ORAL at 05:50

## 2021-09-10 RX ADMIN — LEVETIRACETAM 750 MILLIGRAM(S): 250 TABLET, FILM COATED ORAL at 05:50

## 2021-09-10 RX ADMIN — POLYETHYLENE GLYCOL 3350 17 GRAM(S): 17 POWDER, FOR SOLUTION ORAL at 05:50

## 2021-09-10 NOTE — PROGRESS NOTE ADULT - SUBJECTIVE AND OBJECTIVE BOX
SUBJECTIVE: Patient seen and examined at bedside. Denies any complaints at this time.     OVERNIGHT EVENTS: none     Vital Signs Last 24 Hrs  T(C): 36.8 (10 Sep 2021 08:04), Max: 37.3 (10 Sep 2021 00:23)  T(F): 98.2 (10 Sep 2021 08:04), Max: 99.1 (10 Sep 2021 00:23)  HR: 95 (10 Sep 2021 08:04) (86 - 97)  BP: 113/71 (10 Sep 2021 08:04) (113/71 - 164/84)  BP(mean): --  RR: 18 (10 Sep 2021 08:04) (18 - 18)  SpO2: 96% (10 Sep 2021 08:04) (96% - 99%)    PHYSICAL EXAM:    General: No Acute Distress     Neurological: Alert & oriented to name and place not year.  has difficulty naming objects-aphasia.  left side 5/5 rue drift, rue 4/5 and rle 4+/5    Pulmonary: Clear to Auscultation, No Rales, No Rhonchi, No Wheezes     Cardiovascular: S1, S2, Regular Rate and Rhythm     Gastrointestinal: Soft, Nontender, Nondistended     Incision: c/d/i    LABS: no new labs      MEDICATIONS  (STANDING):  enoxaparin Injectable 40 milliGRAM(s) SubCutaneous <User Schedule>  levETIRAcetam 750 milliGRAM(s) Oral two times a day  lisinopril 10 milliGRAM(s) Oral daily  polyethylene glycol 3350 17 Gram(s) Oral two times a day  senna 2 Tablet(s) Oral at bedtime    MEDICATIONS  (PRN):  acetaminophen   Tablet .. 650 milliGRAM(s) Oral every 6 hours PRN Temp greater or equal to 38C (100.4F), Mild Pain (1 - 3)        DIET: mechanical soft     IMAGING: < from: [facility] Duplex Lower Ext Vein Scan, Bilat (09.08.21 @ 16:01) >    IMPRESSION:  No evidence of deep venous thrombosis in either lower extremity.    < end of copied text >
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
## 2022-05-31 NOTE — DISCHARGE NOTE NURSING/CASE MANAGEMENT/SOCIAL WORK - PATIENT PORTAL LINK FT
ACM attempted to outreach daughter Raymundo Kemp regarding palliative care referral.  No answer; HIPPA compliant message left through VM with ACM contact information. Call return requested. ACM will follow up at later date/time. ACM outreached HOC regarding referral.  Message left requesting call return.
You can access the FollowMyHealth Patient Portal offered by [facility] by registering at the following website: [email or web address]. By joining [employer]’s FollowMyHealth portal, you will also be able to view your health information using other applications (apps) compatible with our system.

## 2022-07-12 NOTE — ASU PREOP CHECKLIST - LATEX ALLERGY
Duration Of Freeze Thaw-Cycle (Seconds): 2 Post-Care Instructions: I reviewed with the patient in detail post-care instructions. Patient is to wear sunprotection, and avoid picking at any of the treated lesions. Pt may apply Vaseline to crusted or scabbing areas. Render Post-Care Instructions In Note?: no Number Of Freeze-Thaw Cycles: 1 freeze-thaw cycle Detail Level: Detailed Show Aperture Variable?: Yes Consent: The patient's consent was obtained including but not limited to risks of crusting, scabbing, blistering, scarring, darker or lighter pigmentary change, recurrence, incomplete removal and infection. no

## 2023-05-24 NOTE — PATIENT PROFILE ADULT - NSPRESCRALCSCORE_GEN_A_NUR_CAL
[facility] [address] [address]  Tel: [phone]  Fax: [phone]    Patient : Meet Magallanes   YOB: 1950   Birth Sex: Male      Current Gender: Male      Date:  04/28/2023 9:30 AM    Visit Type:   Consult   Assessment/Plan  # Detail Type Description    1. Assessment Other stricture of urethra in male (N31.26). Patient Plan Male with history of urethral stricture he has undergone several procedures in the past.  Now status post dilation in the OR has stricture throughout urethra which will will most likely  Recur. This is a difficult case since patient cannot before intermittent catheterization and believe that he would probably benefit from a suprapubic tube but that increases his risk of infection. Will schedule for cystoscopy and balloon. He may benefit from liposuction of suprapubic area however this may be limited to by insurance. Going to schedule for cystoscopy and balloon dilation of stricture possible suprapubic tube placement         2. Assessment Urinary retention (R33.9). Patient Plan plan for OR for balloon dilation and possibly suprapubic catheterization. 3. Assessment Morbid (severe) obesity due to excess calories (E66.01). Additional Visit Information      This 67year old male presents for Urinary retention. History of Present Illness  1. Urinary retention   It occurs constantly. The problem is with no change. Pertinent history includes history of diabetes. Associated symptoms include dysuria, incomplete emptying, intermittent stream, pelvic pressure, slow stream, urgency and dribbling. Pertinent negatives include abnormal ejaculation, chills, constipation, fever, hematuria, pelvic pain, pressure, splitting stream and suprapubic pain. Additional information: Pt presented to ER 4/12/2023 w/urinary retention buried penis large body habitus.  Taken to OR for difficult catheterization only able to 4

## 2024-01-30 NOTE — ED ADULT NURSE NOTE - NS ED PATIENT SAFETY CONCERN
Natalie with [facility] department states the prior authorization for Jazzy has been approved and is open ended.   Unable to assess due to medical condition

## 2024-04-22 NOTE — SPEECH LANGUAGE PATHOLOGY EVALUATION - SLP VERBAL EXPRESSION
impaired fluency; Pt produced occasional true word utterance in response to clinician primarily/impaired 22-Apr-2013
